# Patient Record
Sex: FEMALE | Race: AMERICAN INDIAN OR ALASKA NATIVE
[De-identification: names, ages, dates, MRNs, and addresses within clinical notes are randomized per-mention and may not be internally consistent; named-entity substitution may affect disease eponyms.]

---

## 2018-04-14 ENCOUNTER — HOSPITAL ENCOUNTER (EMERGENCY)
Dept: HOSPITAL 5 - ED | Age: 33
Discharge: HOME | End: 2018-04-14
Payer: MEDICAID

## 2018-04-14 VITALS — SYSTOLIC BLOOD PRESSURE: 128 MMHG | DIASTOLIC BLOOD PRESSURE: 69 MMHG

## 2018-04-14 DIAGNOSIS — R07.9: Primary | ICD-10-CM

## 2018-04-14 DIAGNOSIS — R06.00: ICD-10-CM

## 2018-04-14 DIAGNOSIS — F17.200: ICD-10-CM

## 2018-04-14 LAB
BASOPHILS # (AUTO): 0 K/MM3 (ref 0–0.1)
BASOPHILS NFR BLD AUTO: 0.5 % (ref 0–1.8)
BUN SERPL-MCNC: 8 MG/DL (ref 7–17)
BUN/CREAT SERPL: 11 %
CALCIUM SERPL-MCNC: 9.1 MG/DL (ref 8.4–10.2)
EOSINOPHIL # BLD AUTO: 0.1 K/MM3 (ref 0–0.4)
EOSINOPHIL NFR BLD AUTO: 0.8 % (ref 0–4.3)
HCT VFR BLD CALC: 44.3 % (ref 30.3–42.9)
HEMOLYSIS INDEX: 9
HGB BLD-MCNC: 14.8 GM/DL (ref 10.1–14.3)
LYMPHOCYTES # BLD AUTO: 3.2 K/MM3 (ref 1.2–5.4)
LYMPHOCYTES NFR BLD AUTO: 44.2 % (ref 13.4–35)
MCH RBC QN AUTO: 31 PG (ref 28–32)
MCHC RBC AUTO-ENTMCNC: 33 % (ref 30–34)
MCV RBC AUTO: 92 FL (ref 79–97)
MONOCYTES # (AUTO): 0.7 K/MM3 (ref 0–0.8)
MONOCYTES % (AUTO): 9.4 % (ref 0–7.3)
PLATELET # BLD: 299 K/MM3 (ref 140–440)
RBC # BLD AUTO: 4.83 M/MM3 (ref 3.65–5.03)

## 2018-04-14 PROCEDURE — 96361 HYDRATE IV INFUSION ADD-ON: CPT

## 2018-04-14 PROCEDURE — 84703 CHORIONIC GONADOTROPIN ASSAY: CPT

## 2018-04-14 PROCEDURE — 99285 EMERGENCY DEPT VISIT HI MDM: CPT

## 2018-04-14 PROCEDURE — 96374 THER/PROPH/DIAG INJ IV PUSH: CPT

## 2018-04-14 PROCEDURE — 71046 X-RAY EXAM CHEST 2 VIEWS: CPT

## 2018-04-14 PROCEDURE — 96375 TX/PRO/DX INJ NEW DRUG ADDON: CPT

## 2018-04-14 PROCEDURE — 93010 ELECTROCARDIOGRAM REPORT: CPT

## 2018-04-14 PROCEDURE — 83880 ASSAY OF NATRIURETIC PEPTIDE: CPT

## 2018-04-14 PROCEDURE — 71275 CT ANGIOGRAPHY CHEST: CPT

## 2018-04-14 PROCEDURE — 93005 ELECTROCARDIOGRAM TRACING: CPT

## 2018-04-14 PROCEDURE — 85025 COMPLETE CBC W/AUTO DIFF WBC: CPT

## 2018-04-14 PROCEDURE — 36415 COLL VENOUS BLD VENIPUNCTURE: CPT

## 2018-04-14 PROCEDURE — 80048 BASIC METABOLIC PNL TOTAL CA: CPT

## 2018-04-14 PROCEDURE — 84484 ASSAY OF TROPONIN QUANT: CPT

## 2018-04-14 NOTE — EMERGENCY DEPARTMENT REPORT
HPI





- General


Chief Complaint: Dyspnea/Respdistress


Time Seen by Provider: 04/14/18 16:46





- HPI


HPI: 








The patient is a 32-year-old female who presents for evaluation of chest pain, 

dyspnea, and left scapula pain.  The patient reports left-sided chest pain and 

scapular pain for the past 2 hours, constant since onset, sharp in quality, 10/

10 in severity, exacerbated with inspiration.  The patient denies trauma to the 

back or chest, fever, syncope, hemoptysis, unilateral leg swelling, recent 

immobilization, history of DVT or PE, hx of recent cancer.








ED Past Medical Hx





- Past Medical History


Hx Hypertension: Yes


Hx Arthritis: Yes





- Surgical History


Past Surgical History?: No





- Social History


Smoking Status: Current Every Day Smoker


Substance Use Type: None





ED Review of Systems


ROS: 


Stated complaint: back pain


Other details as noted in HPI





Constitutional: denies: fever


ENT: denies: throat or neck pain


Respiratory: reports shortness of breath


Cardiovascular: reports chest pain


Endocrine: denies unexplained weight loss or gain


Gastrointestinal: denies: abdominal pain, nausea


Genitourinary: denies: dysuria


Musculoskeletal: denies: leg swelling


Skin: denies: rash


Neurological: pain denies: headache


Hematological/Lymphatic: denies: easy bleeding or easy bruising  


Psych: denies sadness or hopelessness











Physical Exam





- Physical Exam


Vital Signs: 


 Vital Signs











  04/14/18 04/14/18 04/14/18





  16:24 16:25 16:26


 


Temperature   


 


Pulse Rate 94 H 93 H 95 H


 


Respiratory  18 19





Rate   


 


Blood Pressure   149/45


 


O2 Sat by Pulse   99





Oximetry   














  04/14/18 04/14/18 04/14/18





  16:27 16:29 16:31


 


Temperature   


 


Pulse Rate 92 H 80 86


 


Respiratory 14 12 26 H





Rate   


 


Blood Pressure 149/45 149/45 120/73


 


O2 Sat by Pulse 100 100 79 L





Oximetry   














  04/14/18 04/14/18 04/14/18





  16:33 16:35 16:36


 


Temperature 98.1 F  97.3 F L


 


Pulse Rate 78 89 


 


Respiratory 17 17 





Rate   


 


Blood Pressure 120/73 120/73 


 


O2 Sat by Pulse 99 99 





Oximetry   














  04/14/18 04/14/18 04/14/18





  16:37 16:39 16:41


 


Temperature   


 


Pulse Rate 86 85 84


 


Respiratory 18 22 18





Rate   


 


Blood Pressure 120/73 120/73 120/73


 


O2 Sat by Pulse 100 100 100





Oximetry   














  04/14/18 04/14/18 04/14/18





  16:43 16:45 16:47


 


Temperature   


 


Pulse Rate 71 64 88


 


Respiratory 15 15 17





Rate   


 


Blood Pressure 120/73 116/66 116/66


 


O2 Sat by Pulse 100 100 100





Oximetry   














  04/14/18 04/14/18 04/14/18





  16:49 16:51 16:53


 


Temperature   


 


Pulse Rate 95 H 90 77


 


Respiratory 17 24 16





Rate   


 


Blood Pressure 116/66 116/66 116/66


 


O2 Sat by Pulse 100 100 99





Oximetry   














  04/14/18 04/14/18 04/14/18





  16:55 16:57 16:59


 


Temperature   


 


Pulse Rate 95 H 81 86


 


Respiratory 18 12 13





Rate   


 


Blood Pressure 116/66 116/66 117/66


 


O2 Sat by Pulse 100 100 100





Oximetry   














  04/14/18 04/14/18 04/14/18





  17:00 17:01 17:03


 


Temperature   


 


Pulse Rate 86 80 82


 


Respiratory 10 L 19 17





Rate   


 


Blood Pressure 117/66 117/66 117/66


 


O2 Sat by Pulse 100 100 100





Oximetry   














  04/14/18 04/14/18 04/14/18





  17:05 17:07 17:09


 


Temperature   


 


Pulse Rate 85 86 80


 


Respiratory 9 L 14 13





Rate   


 


Blood Pressure 117/66 117/66 117/66


 


O2 Sat by Pulse 100 100 100





Oximetry   














  04/14/18 04/14/18 04/14/18





  17:11 17:14 17:15


 


Temperature   


 


Pulse Rate 69 77 76


 


Respiratory 15 14 14





Rate   


 


Blood Pressure 117/66 117/66 117/64


 


O2 Sat by Pulse 100 100 100





Oximetry   














  04/14/18





  17:18


 


Temperature 


 


Pulse Rate 


 


Respiratory 18





Rate 


 


Blood Pressure 


 


O2 Sat by Pulse 100





Oximetry 











Physical Exam: 








General: well-nourished, well-developed, no acute distress


Head: Normocephalic, atraumatic


Eyes: normal sclera


ENT: Mucous membranes are pink and moist 


Neck: trachea midline, neck supple, No neck stiffness, no cervical adenopathy


Respiratory: Breath sounds equal bilaterally, no wheezing, rales, or rhonchi


Cardio: S1 and S2 present, no murmurs, rubs, gallops, capillary refill is brisk


Abdomen: Normoactive bowel sounds, soft abdomen, no rigidity, no guarding or 

rebound tenderness


Chest WALL/Back: No tenderness to palpation of the chest wall, no CVA 

tenderness with percussion


Musc: No pitting edema


Skin: No rash


Neuro: no facial drooping, normal speech


Psych: Normal affect





ED Course


 Vital Signs











  04/14/18 04/14/18 04/14/18





  16:24 16:25 16:26


 


Temperature   


 


Pulse Rate 94 H 93 H 95 H


 


Respiratory  18 19





Rate   


 


Blood Pressure   149/45


 


O2 Sat by Pulse   99





Oximetry   














  04/14/18 04/14/18 04/14/18





  16:27 16:29 16:31


 


Temperature   


 


Pulse Rate 92 H 80 86


 


Respiratory 14 12 26 H





Rate   


 


Blood Pressure 149/45 149/45 120/73


 


O2 Sat by Pulse 100 100 79 L





Oximetry   














  04/14/18 04/14/18 04/14/18





  16:33 16:35 16:36


 


Temperature 98.1 F  97.3 F L


 


Pulse Rate 78 89 


 


Respiratory 17 17 





Rate   


 


Blood Pressure 120/73 120/73 


 


O2 Sat by Pulse 99 99 





Oximetry   














  04/14/18 04/14/18 04/14/18





  16:37 16:39 16:41


 


Temperature   


 


Pulse Rate 86 85 84


 


Respiratory 18 22 18





Rate   


 


Blood Pressure 120/73 120/73 120/73


 


O2 Sat by Pulse 100 100 100





Oximetry   














  04/14/18 04/14/18 04/14/18





  16:43 16:45 16:47


 


Temperature   


 


Pulse Rate 71 64 88


 


Respiratory 15 15 17





Rate   


 


Blood Pressure 120/73 116/66 116/66


 


O2 Sat by Pulse 100 100 100





Oximetry   














  04/14/18 04/14/18 04/14/18





  16:49 16:51 16:53


 


Temperature   


 


Pulse Rate 95 H 90 77


 


Respiratory 17 24 16





Rate   


 


Blood Pressure 116/66 116/66 116/66


 


O2 Sat by Pulse 100 100 99





Oximetry   














  04/14/18 04/14/18 04/14/18





  16:55 16:57 16:59


 


Temperature   


 


Pulse Rate 95 H 81 86


 


Respiratory 18 12 13





Rate   


 


Blood Pressure 116/66 116/66 117/66


 


O2 Sat by Pulse 100 100 100





Oximetry   














  04/14/18 04/14/18 04/14/18





  17:00 17:01 17:03


 


Temperature   


 


Pulse Rate 86 80 82


 


Respiratory 10 L 19 17





Rate   


 


Blood Pressure 117/66 117/66 117/66


 


O2 Sat by Pulse 100 100 100





Oximetry   














  04/14/18 04/14/18 04/14/18





  17:05 17:07 17:09


 


Temperature   


 


Pulse Rate 85 86 80


 


Respiratory 9 L 14 13





Rate   


 


Blood Pressure 117/66 117/66 117/66


 


O2 Sat by Pulse 100 100 100





Oximetry   














  04/14/18 04/14/18 04/14/18





  17:11 17:14 17:15


 


Temperature   


 


Pulse Rate 69 77 76


 


Respiratory 15 14 14





Rate   


 


Blood Pressure 117/66 117/66 117/64


 


O2 Sat by Pulse 100 100 100





Oximetry   














  04/14/18





  17:18


 


Temperature 


 


Pulse Rate 


 


Respiratory 18





Rate 


 


Blood Pressure 


 


O2 Sat by Pulse 100





Oximetry 














ED Medical Decision Making





- Lab Data


Result diagrams: 


 04/14/18 16:42





 04/14/18 16:42





- Medical Decision Making








The patient was seen and examined by myself.  The patient is placed on a 

cardiac monitor and continuous pulse ox. On initial evaluation, the patient was 

found to be in no distress.  EKG was negative for findings suggestive of acute 

cardiac infarct. Labs and imaging are obtained.  The patient is given pain 

medicine. Chest x-ray is negative for pneumothorax, focal consolidation, 

pulmonary vascular congestion, pleural effusion, or other obvious acute 

cardiopulmonary disease process.  Lab results were non-concerning including 

levels of troponin, WBC, hemoglobin, hematocrit, electrolytes, renal function. 

The patient was reevaluated and reported that their symptoms were markedly 

improved.  As the patient has a KRISTOPHER risk score less than 2, and received a CT 

angiogram of the chest negative for pulmonary embolism, the patient is at low 

risk of ACS or pulmonary emboli etiology of their symptoms. The patient is 

stable for discharge with outpatient follow-up.  The patient is given follow-up 

and return instructions.  The patient expressed understanding and agreed with 

the plan.  The patient is discharged in stable condition.


Critical care attestation.: 


If time is entered above; I have spent that time in minutes in the direct care 

of this critically ill patient, excluding procedure time.








ED Disposition


Clinical Impression: 


 Acute chest pain, Dyspnea on effort





Disposition: DC-01 TO HOME OR SELFCARE


Is pt being admited?: No


Does the pt Need Aspirin: No


Condition: Stable


Instructions:  Chest Pain (ED)


Referrals: 


PRIMARY CARE,MD [Primary Care Provider] - 3-5 Days


Time of Disposition: 18:03

## 2018-04-14 NOTE — CAT SCAN REPORT
FINAL REPORT



PROCEDURE:  CT ANGIO CHEST



TECHNIQUE:  Computerized tomographic angiography of the chest was

performed after the IV injection of iodinated nonionic contrast

including image processing. The image data was postprocessed

using 2-dimensional multiplanar reformatted (MPR) and

3-dimensional (MIP and/or volume rendered) techniques. 



HISTORY:  chest pain 



COMPARISON:  No prior studies are available for comparison.



FINDINGS:  

Aorta is of normal caliber without evidence of dissection. There

is suboptimal opacification of pulmonary arterial tree without

any obvious filling defects. Thyroid demonstrates normal density.

Hilar structures are within normal limits. There is no

lymphadenopathy. Cardiac size is within normal limits. Bilateral

lungs are free of infiltrates or mass lesions. Pleural spaces are

clear. Visualized upper abdominal structures are within normal

limits. Vertebral height is normal. 



IMPRESSION:  

No acute abnormality.

## 2018-04-14 NOTE — XRAY REPORT
FINAL REPORT



PROCEDURE:  XR CHEST ROUTINE 2V



TECHNIQUE:  PA and lateral chest radiographs were obtained. CPT

80190







HISTORY:  Shortness of breath 



COMPARISON:  No prior studies are available for comparison.



FINDINGS:  

Heart: Normal.



Mediastinum/Vessels: Normal.



Lungs/Pleural space: Normal.



Bony thorax: No acute osseous abnormality.



Other: 



IMPRESSION:  

Normal examination.

## 2020-02-21 ENCOUNTER — HOSPITAL ENCOUNTER (OUTPATIENT)
Dept: HOSPITAL 5 - ED | Age: 35
Setting detail: OBSERVATION
LOS: 3 days | Discharge: HOME | End: 2020-02-24
Attending: INTERNAL MEDICINE | Admitting: INTERNAL MEDICINE
Payer: MEDICAID

## 2020-02-21 DIAGNOSIS — E87.2: ICD-10-CM

## 2020-02-21 DIAGNOSIS — K52.9: ICD-10-CM

## 2020-02-21 DIAGNOSIS — E86.0: ICD-10-CM

## 2020-02-21 DIAGNOSIS — Z79.899: ICD-10-CM

## 2020-02-21 DIAGNOSIS — K59.00: ICD-10-CM

## 2020-02-21 DIAGNOSIS — I10: ICD-10-CM

## 2020-02-21 DIAGNOSIS — N17.9: Primary | ICD-10-CM

## 2020-02-21 DIAGNOSIS — F19.10: ICD-10-CM

## 2020-02-21 DIAGNOSIS — R11.2: ICD-10-CM

## 2020-02-21 DIAGNOSIS — F17.200: ICD-10-CM

## 2020-02-21 DIAGNOSIS — M19.90: ICD-10-CM

## 2020-02-21 LAB
ALBUMIN SERPL-MCNC: 3.6 G/DL (ref 3.9–5)
ALT SERPL-CCNC: 8 UNITS/L (ref 7–56)
BACTERIA #/AREA URNS HPF: (no result) /HPF
BASOPHILS # (AUTO): 0.1 K/MM3 (ref 0–0.1)
BASOPHILS NFR BLD AUTO: 0.7 % (ref 0–1.8)
BILIRUB UR QL STRIP: (no result)
BLOOD UR QL VISUAL: (no result)
BUN SERPL-MCNC: 18 MG/DL (ref 7–17)
BUN/CREAT SERPL: 5 %
CALCIUM SERPL-MCNC: 8.9 MG/DL (ref 8.4–10.2)
EOSINOPHIL # BLD AUTO: 0 K/MM3 (ref 0–0.4)
EOSINOPHIL NFR BLD AUTO: 0.2 % (ref 0–4.3)
HCT VFR BLD CALC: 39.9 % (ref 30.3–42.9)
HEMOLYSIS INDEX: 100
HGB BLD-MCNC: 13.1 GM/DL (ref 10.1–14.3)
LYMPHOCYTES # BLD AUTO: 1.9 K/MM3 (ref 1.2–5.4)
LYMPHOCYTES NFR BLD AUTO: 15.1 % (ref 13.4–35)
MCHC RBC AUTO-ENTMCNC: 33 % (ref 30–34)
MCV RBC AUTO: 90 FL (ref 79–97)
MONOCYTES # (AUTO): 1.1 K/MM3 (ref 0–0.8)
MONOCYTES % (AUTO): 8.6 % (ref 0–7.3)
MUCOUS THREADS #/AREA URNS HPF: (no result) /HPF
PH UR STRIP: 5 [PH] (ref 5–7)
PLATELET # BLD: 415 K/MM3 (ref 140–440)
PROT UR STRIP-MCNC: (no result) MG/DL
RBC # BLD AUTO: 4.42 M/MM3 (ref 3.65–5.03)
RBC #/AREA URNS HPF: 2 /HPF (ref 0–6)
UROBILINOGEN UR-MCNC: < 2 MG/DL (ref ?–2)
WBC #/AREA URNS HPF: 1 /HPF (ref 0–6)

## 2020-02-21 PROCEDURE — 96375 TX/PRO/DX INJ NEW DRUG ADDON: CPT

## 2020-02-21 PROCEDURE — 76770 US EXAM ABDO BACK WALL COMP: CPT

## 2020-02-21 PROCEDURE — 80320 DRUG SCREEN QUANTALCOHOLS: CPT

## 2020-02-21 PROCEDURE — 85730 THROMBOPLASTIN TIME PARTIAL: CPT

## 2020-02-21 PROCEDURE — 88305 TISSUE EXAM BY PATHOLOGIST: CPT

## 2020-02-21 PROCEDURE — 81001 URINALYSIS AUTO W/SCOPE: CPT

## 2020-02-21 PROCEDURE — 96374 THER/PROPH/DIAG INJ IV PUSH: CPT

## 2020-02-21 PROCEDURE — 74176 CT ABD & PELVIS W/O CONTRAST: CPT

## 2020-02-21 PROCEDURE — 83690 ASSAY OF LIPASE: CPT

## 2020-02-21 PROCEDURE — C9113 INJ PANTOPRAZOLE SODIUM, VIA: HCPCS

## 2020-02-21 PROCEDURE — 43235 EGD DIAGNOSTIC BRUSH WASH: CPT

## 2020-02-21 PROCEDURE — 80048 BASIC METABOLIC PNL TOTAL CA: CPT

## 2020-02-21 PROCEDURE — 88342 IMHCHEM/IMCYTCHM 1ST ANTB: CPT

## 2020-02-21 PROCEDURE — 36415 COLL VENOUS BLD VENIPUNCTURE: CPT

## 2020-02-21 PROCEDURE — 85610 PROTHROMBIN TIME: CPT

## 2020-02-21 PROCEDURE — 99284 EMERGENCY DEPT VISIT MOD MDM: CPT

## 2020-02-21 PROCEDURE — 84156 ASSAY OF PROTEIN URINE: CPT

## 2020-02-21 PROCEDURE — 96361 HYDRATE IV INFUSION ADD-ON: CPT

## 2020-02-21 PROCEDURE — G0378 HOSPITAL OBSERVATION PER HR: HCPCS

## 2020-02-21 PROCEDURE — 96376 TX/PRO/DX INJ SAME DRUG ADON: CPT

## 2020-02-21 PROCEDURE — 84300 ASSAY OF URINE SODIUM: CPT

## 2020-02-21 PROCEDURE — G0480 DRUG TEST DEF 1-7 CLASSES: HCPCS

## 2020-02-21 PROCEDURE — 80307 DRUG TEST PRSMV CHEM ANLYZR: CPT

## 2020-02-21 PROCEDURE — 83930 ASSAY OF BLOOD OSMOLALITY: CPT

## 2020-02-21 PROCEDURE — 85025 COMPLETE CBC W/AUTO DIFF WBC: CPT

## 2020-02-21 PROCEDURE — 71045 X-RAY EXAM CHEST 1 VIEW: CPT

## 2020-02-21 PROCEDURE — 82570 ASSAY OF URINE CREATININE: CPT

## 2020-02-21 PROCEDURE — 80053 COMPREHEN METABOLIC PANEL: CPT

## 2020-02-21 PROCEDURE — 84703 CHORIONIC GONADOTROPIN ASSAY: CPT

## 2020-02-21 PROCEDURE — 96372 THER/PROPH/DIAG INJ SC/IM: CPT

## 2020-02-21 PROCEDURE — 84550 ASSAY OF BLOOD/URIC ACID: CPT

## 2020-02-22 LAB
BASOPHILS # (AUTO): 0 K/MM3 (ref 0–0.1)
BASOPHILS NFR BLD AUTO: 0.4 % (ref 0–1.8)
BENZODIAZEPINES SCREEN,URINE: (no result)
BUN SERPL-MCNC: 17 MG/DL (ref 7–17)
BUN/CREAT SERPL: 5 %
CALCIUM SERPL-MCNC: 7.9 MG/DL (ref 8.4–10.2)
EOSINOPHIL # BLD AUTO: 0.1 K/MM3 (ref 0–0.4)
EOSINOPHIL NFR BLD AUTO: 0.5 % (ref 0–4.3)
HCT VFR BLD CALC: 37.3 % (ref 30.3–42.9)
HEMOLYSIS INDEX: 8
HGB BLD-MCNC: 12.3 GM/DL (ref 10.1–14.3)
LYMPHOCYTES # BLD AUTO: 2.1 K/MM3 (ref 1.2–5.4)
LYMPHOCYTES NFR BLD AUTO: 19.3 % (ref 13.4–35)
MCHC RBC AUTO-ENTMCNC: 33 % (ref 30–34)
MCV RBC AUTO: 89 FL (ref 79–97)
METHADONE SCREEN,URINE: (no result)
MONOCYTES # (AUTO): 1.1 K/MM3 (ref 0–0.8)
MONOCYTES % (AUTO): 10 % (ref 0–7.3)
OPIATE SCREEN,URINE: (no result)
PLATELET # BLD: 395 K/MM3 (ref 140–440)
RBC # BLD AUTO: 4.19 M/MM3 (ref 3.65–5.03)

## 2020-02-22 RX ADMIN — MORPHINE SULFATE PRN MG: 2 INJECTION, SOLUTION INTRAMUSCULAR; INTRAVENOUS at 06:17

## 2020-02-22 RX ADMIN — LACTULOSE SCH: 20 SOLUTION ORAL at 17:34

## 2020-02-22 RX ADMIN — MORPHINE SULFATE PRN MG: 2 INJECTION, SOLUTION INTRAMUSCULAR; INTRAVENOUS at 02:04

## 2020-02-22 RX ADMIN — Medication SCH ML: at 22:47

## 2020-02-22 RX ADMIN — HEPARIN SODIUM SCH UNIT: 5000 INJECTION, SOLUTION INTRAVENOUS; SUBCUTANEOUS at 06:14

## 2020-02-22 RX ADMIN — MORPHINE SULFATE PRN MG: 2 INJECTION, SOLUTION INTRAMUSCULAR; INTRAVENOUS at 22:46

## 2020-02-22 RX ADMIN — SODIUM CHLORIDE SCH MLS/HR: 0.9 INJECTION, SOLUTION INTRAVENOUS at 01:16

## 2020-02-22 RX ADMIN — HEPARIN SODIUM SCH UNIT: 5000 INJECTION, SOLUTION INTRAVENOUS; SUBCUTANEOUS at 15:03

## 2020-02-22 RX ADMIN — Medication SCH: at 10:08

## 2020-02-22 RX ADMIN — ONDANSETRON PRN MG: 2 INJECTION INTRAMUSCULAR; INTRAVENOUS at 22:46

## 2020-02-22 RX ADMIN — ONDANSETRON PRN MG: 2 INJECTION INTRAMUSCULAR; INTRAVENOUS at 11:00

## 2020-02-22 RX ADMIN — SODIUM CHLORIDE SCH MLS/HR: 0.9 INJECTION, SOLUTION INTRAVENOUS at 14:55

## 2020-02-22 RX ADMIN — ONDANSETRON PRN MG: 2 INJECTION INTRAMUSCULAR; INTRAVENOUS at 14:53

## 2020-02-22 RX ADMIN — MORPHINE SULFATE PRN MG: 2 INJECTION, SOLUTION INTRAMUSCULAR; INTRAVENOUS at 10:57

## 2020-02-22 RX ADMIN — MORPHINE SULFATE PRN MG: 2 INJECTION, SOLUTION INTRAMUSCULAR; INTRAVENOUS at 14:53

## 2020-02-22 RX ADMIN — HEPARIN SODIUM SCH UNIT: 5000 INJECTION, SOLUTION INTRAVENOUS; SUBCUTANEOUS at 22:46

## 2020-02-22 RX ADMIN — SODIUM CHLORIDE SCH MLS/HR: 0.9 INJECTION, SOLUTION INTRAVENOUS at 23:00

## 2020-02-22 RX ADMIN — LACTULOSE SCH GM: 20 SOLUTION ORAL at 17:46

## 2020-02-22 NOTE — HISTORY AND PHYSICAL REPORT
History of Present Illness


Date of examination: 02/22/20


Date of admission: 


02/22/20 00:38





Chief complaint: 





Nausea and vomiting


Constipation


History of present illness: 





34 year old  female seen in the ER c/o nausea and vomiting for 

three days. She has had some constipation .She denies any fever, no chills, no 

abdominal pain, no dysuria or hematuria.


She states she ate some chicken at a restaurant few days ago and subsequently 

started having nausea and vomiting. She denies any sick contacts, no recent 

travels.





Evaluation in the ER including CT abdomen did not reveal any significant 

abnormalities. BUN/Creatinine was however elevated.





Past History


Past Medical History: hypertension


Past Surgical History: No surgical history


Social history: smoking (Smokes occasionally), alcohol abuse (Alcohol 

occasionally)


Family history: cancer (Breast cancer runs in family)





Medications and Allergies


                                    Allergies











Allergy/AdvReac Type Severity Reaction Status Date / Time


 


No Known Allergies Allergy   Verified 02/21/20 19:46











                                Home Medications











 Medication  Instructions  Recorded  Confirmed  Last Taken  Type


 


No Known Home Medications [No  02/22/20 02/22/20 Unknown History





Reported Home Medications]     











Active Meds: 


Active Medications





Acetaminophen (Tylenol)  650 mg PO Q4H PRN


   PRN Reason: Pain MILD(1-3)/Fever >100.5/HA


Heparin Sodium (Porcine) (Heparin)  5,000 unit SUB-Q Q8HR HOMER


Sodium Chloride (Nacl 0.9% 1000 Ml)  1,000 mls @ 125 mls/hr IV AS DIRECT HOMER


   Last Admin: 02/22/20 01:16 Dose:  125 mls/hr


   Documented by: 


Magnesium Hydroxide (Milk Of Magnesia)  30 ml PO Q4H PRN


   PRN Reason: Constipation


Morphine Sulfate (Morphine)  2 mg IV Q4H PRN


   PRN Reason: Pain, Moderate (4-6)


Ondansetron HCl (Zofran)  4 mg IV Q8H PRN


   PRN Reason: Nausea And Vomiting


Sodium Chloride (Sodium Chloride Flush Syringe 10 Ml)  10 ml IV BID HOMER


Sodium Chloride (Sodium Chloride Flush Syringe 10 Ml)  10 ml IV PRN PRN


   PRN Reason: LINE FLUSH











Review of Systems


Constitutional: no fever, no chills


Cardiovascular: no chest pain, no palpitations


Respiratory: no cough, no hemoptysis


Gastrointestinal: abdominal pain, nausea, vomiting, constipation, loss of 

appetite


Genitourinary Female: no dysuria, no hematuria


Musculoskeletal: no neck pain, no low back pain


Integumentary: no rash, no pruritis





Exam





- Constitutional


Vitals: 


                                        











Temp Pulse Resp BP Pulse Ox


 


 97.9 F   76   16   118/79   100 


 


 02/22/20 01:08  02/22/20 01:08  02/22/20 01:08  02/22/20 01:08  02/22/20 01:08











General appearance: Present: no acute distress, well-nourished





- EENT


Eyes: Present: PERRL, EOM intact


ENT: hearing intact, clear oral mucosa, dentition normal





- Neck


Neck: Present: supple, normal ROM





- Respiratory


Respiratory effort: normal


Respiratory: bilateral: CTA





- Cardiovascular


Rhythm: regular


Heart Sounds: Present: S1 & S2





- Extremities


Extremities: no ischemia, pulses intact, pulses symmetrical, No edema, Full ROM


Peripheral Pulses: within normal limits





- Abdominal


General gastrointestinal: Present: soft, non-tender, non-distended, normal bowel

sounds





- Integumentary


Integumentary: Present: clear, warm, dry, normal turgor





- Musculoskeletal


Musculoskeletal: strength equal bilaterally





- Psychiatric


Psychiatric: appropriate mood/affect, intact judgment & insight, cooperative





- Neurologic


Neurologic: CNII-XII intact, moves all extremities





Results





- Labs


CBC & Chem 7: 


                                 02/21/20 20:49





                                 02/21/20 20:49


Labs: 


                              Abnormal lab results











  02/21/20 02/21/20 Range/Units





  20:49 20:49 


 


WBC  12.9 H   (4.5-11.0)  K/mm3


 


Mono % (Auto)  8.6 H   (0.0-7.3)  %


 


Mono #  1.1 H   (0.0-0.8)  K/mm3


 


Seg Neutrophils %  75.4 H   (40.0-70.0)  %


 


Seg Neutrophils #  9.7 H   (1.8-7.7)  K/mm3


 


Sodium   135 L  (137-145)  mmol/L


 


BUN   18 H  (7-17)  mg/dL


 


Creatinine   3.5 H  (0.7-1.2)  mg/dL


 


Albumin   3.6 L  (3.9-5)  g/dL














Assessment and Plan





- Patient Problems


(1) LOUIE (acute kidney injury)


Current Visit: Yes   Status: Acute   


Plan to address problem: 


Probably secondary to the nausea and vomiting. Started on IV fluid.


Will monitor Bun/Creatinine.








(2) Severe dehydration


Current Visit: Yes   Status: Acute   


Plan to address problem: 


Secondary to nausea and vomiting. Continue on hydration with IV fluid.








(3) DVT prophylaxis


Current Visit: Yes   Status: Acute   


Plan to address problem: 


Placed on subcutaneous heparin.








(4) Full code status


Current Visit: Yes   Status: Acute

## 2020-02-22 NOTE — PROGRESS NOTE
Assessment and Plan


Assessment and plan: 


34 year old  female seen in the ER c/o nausea and vomiting for 

three days. She has had some constipation .She denies any fever, no chills, no 

abdominal pain, no dysuria or hematuria.


She states she ate some chicken at a restaurant few days ago and subsequently 

started having nausea and vomiting. She denies any sick contacts, no recent 

travels. Evaluation in the ER including CT abdomen did not reveal any 

significant abnormalities. BUN/Creatinine was however elevate





* Patient continues to have Epigastric abdominal pain, has not been able to 

  tolerate PO intake and still no BM. 


* She initially denied use of Marijuana but when advised of UDS, said she took 

  it from the sister after having abdominal pain, nausea and vomiting x 3 days 

  and her sister said to try it and it will work. she states it calmed her 

  nerves. she denies any fever


* Increase IVF to 150cc an hr, creatinine still 3.1. She denies any past renal 

  disease. She although reports hx of HTN


* Give lactulose


* LFTs are normal.


* GI consulted. 


* Anticipate discharge in am. 








LOUIE Secondary to vasomotor nephropathy


Gastroenteritis severe


Abdominal pain- Epigastric-Etiology unknown


Constipation


Persistent Nausea and Vomiting


Polysubstance abuse. 


Acute Metabolic Acidosis





History


Interval history: 


Patient seen and examined remains in pain with epigastric pain 7 out of 10 in 

intensity still nauseated but has not been calling the nurses to let them know.








Hospitalist Physical





- Physical exam


Narrative exam: 


VITAL SIGNS:  Reviewed.    


GENERAL:  The patient appears normally developed, Vital signs as documented.


HEAD:  No signs of head trauma.


EYES:  Pupils are equal.  Extraocular motions intact.  


EARS:  Hearing grossly intact.


MOUTH:  Oropharynx is normal. 


NECK:  No adenopathy, no JVD.  


CHEST:  Chest with clear breath sounds bilaterally.  No wheezes, rales, or 

rhonchi.  


CARDIAC:  Regular rate and rhythm.  S1 and S2, without murmurs, gallops, or 

rubs.


VASCULAR:  No Edema.  Peripheral pulses normal and equal in all extremities.


ABDOMEN:  Soft, epigastric tender.  And non distended.  No   rebound or gua

rding, and no masses palpated.   Bowel Sounds normal.


MUSCULOSKELETAL:  Good range of motion of all major joints. Extremities without 

clubbing, cyanosis or edema.  


NEUROLOGIC EXAM:  Alert and oriented x 3   No focal sensory or strength 

deficits.   Speech normal.  Follows commands.


PSYCHIATRIC:  Mood normal.


SKIN: Multiple tattoos.  Detail exam as documented in skin assessment








- Constitutional


Vitals: 


                                        











Temp Pulse Resp BP Pulse Ox


 


 98.2 F   76   18   116/68   100 


 


 02/22/20 04:38  02/22/20 01:57  02/22/20 06:17  02/22/20 04:38  02/22/20 01:57











General appearance: Present: no acute distress, well-nourished





Results





- Labs


CBC & Chem 7: 


                                 02/22/20 09:03





                                 02/22/20 09:03


Labs: 


                             Laboratory Last Values











WBC  11.0 K/mm3 (4.5-11.0)   02/22/20  09:03    


 


RBC  4.19 M/mm3 (3.65-5.03)   02/22/20  09:03    


 


Hgb  12.3 gm/dl (10.1-14.3)   02/22/20  09:03    


 


Hct  37.3 % (30.3-42.9)   02/22/20  09:03    


 


MCV  89 fl (79-97)   02/22/20  09:03    


 


MCH  29 pg (28-32)   02/22/20  09:03    


 


MCHC  33 % (30-34)   02/22/20  09:03    


 


RDW  14.8 % (13.2-15.2)   02/22/20  09:03    


 


Plt Count  395 K/mm3 (140-440)   02/22/20  09:03    


 


Lymph % (Auto)  19.3 % (13.4-35.0)   02/22/20  09:03    


 


Mono % (Auto)  10.0 % (0.0-7.3)  H  02/22/20  09:03    


 


Eos % (Auto)  0.5 % (0.0-4.3)   02/22/20  09:03    


 


Baso % (Auto)  0.4 % (0.0-1.8)   02/22/20  09:03    


 


Lymph #  2.1 K/mm3 (1.2-5.4)   02/22/20  09:03    


 


Mono #  1.1 K/mm3 (0.0-0.8)  H  02/22/20  09:03    


 


Eos #  0.1 K/mm3 (0.0-0.4)   02/22/20  09:03    


 


Baso #  0.0 K/mm3 (0.0-0.1)   02/22/20  09:03    


 


Seg Neutrophils %  69.8 % (40.0-70.0)   02/22/20  09:03    


 


Seg Neutrophils #  7.7 K/mm3 (1.8-7.7)   02/22/20  09:03    


 


Sodium  138 mmol/L (137-145)   02/22/20  09:03    


 


Potassium  4.0 mmol/L (3.6-5.0)   02/22/20  09:03    


 


Chloride  107.9 mmol/L ()  H  02/22/20  09:03    


 


Carbon Dioxide  18 mmol/L (22-30)  L  02/22/20  09:03    


 


Anion Gap  16 mmol/L  02/22/20  09:03    


 


BUN  17 mg/dL (7-17)   02/22/20  09:03    


 


Creatinine  3.1 mg/dL (0.7-1.2)  H  02/22/20  09:03    


 


Estimated GFR  21 ml/min  02/22/20  09:03    


 


BUN/Creatinine Ratio  5 %  02/22/20  09:03    


 


Glucose  112 mg/dL ()  H  02/22/20  09:03    


 


Calcium  7.9 mg/dL (8.4-10.2)  L  02/22/20  09:03    


 


Total Bilirubin  0.30 mg/dL (0.1-1.2)   02/21/20  20:49    


 


AST  16 units/L (5-40)   02/21/20  20:49    


 


ALT  8 units/L (7-56)   02/21/20  20:49    


 


Alkaline Phosphatase  61 units/L ()   02/21/20  20:49    


 


Total Protein  6.5 g/dL (6.3-8.2)   02/21/20  20:49    


 


Albumin  3.6 g/dL (3.9-5)  L  02/21/20  20:49    


 


Albumin/Globulin Ratio  1.2 %  02/21/20  20:49    


 


Lipase  55 units/L (13-60)   02/21/20  20:49    


 


HCG, Qual  Negative  (Negative)   02/21/20  20:49    


 


Urine Color  Yellow  (Yellow)   02/21/20  20:52    


 


Urine Turbidity  Clear  (Clear)   02/21/20  20:52    


 


Urine pH  5.0  (5.0-7.0)   02/21/20  20:52    


 


Ur Specific Gravity  1.011  (1.003-1.030)   02/21/20  20:52    


 


Urine Protein  <15 mg/dl mg/dL (Negative)   02/21/20  20:52    


 


Urine Glucose (UA)  Neg mg/dL (Negative)   02/21/20  20:52    


 


Urine Ketones  Neg mg/dL (Negative)   02/21/20  20:52    


 


Urine Blood  Neg  (Negative)   02/21/20  20:52    


 


Urine Nitrite  Neg  (Negative)   02/21/20  20:52    


 


Urine Bilirubin  Neg  (Negative)   02/21/20  20:52    


 


Urine Urobilinogen  < 2.0 mg/dL (<2.0)   02/21/20  20:52    


 


Ur Leukocyte Esterase  Neg  (Negative)   02/21/20  20:52    


 


Urine WBC (Auto)  1.0 /HPF (0.0-6.0)   02/21/20  20:52    


 


Urine RBC (Auto)  2.0 /HPF (0.0-6.0)   02/21/20  20:52    


 


U Epithel Cells (Auto)  3.0 /HPF (0-13.0)   02/21/20  20:52    


 


Urine Bacteria (Auto)  2+ /HPF (Negative)   02/21/20  20:52    


 


Urine Mucus  Few /HPF  02/21/20  20:52    


 


Urine Opiates Screen  Presumptive negative   02/22/20  01:02    


 


Urine Methadone Screen  Presumptive negative   02/22/20  01:02    


 


Ur Barbiturates Screen  Presumptive negative   02/22/20  01:02    


 


Ur Phencyclidine Scrn  Presumptive negative   02/22/20  01:02    


 


Ur Amphetamines Screen  Presumptive positive   02/22/20  01:02    


 


U Benzodiazepines Scrn  Presumptive negative   02/22/20  01:02    


 


Urine Cocaine Screen  Presumptive negative   02/22/20  01:02    


 


U Marijuana (THC) Screen  Presumptive positive   02/22/20  01:02    


 


Drugs of Abuse Note  Disclamer   02/22/20  01:02    


 


Plasma/Serum Alcohol  < 0.01 % (0-0.07)   02/22/20  00:59    














Active Medications





- Current Medications


Current Medications: 














Generic Name Dose Route Start Last Admin





  Trade Name Freq  PRN Reason Stop Dose Admin


 


Acetaminophen  650 mg  02/22/20 00:49 





  Tylenol  PO  





  Q4H PRN  





  Pain MILD(1-3)/Fever >100.5/HA  


 


Heparin Sodium (Porcine)  5,000 unit  02/22/20 06:00  02/22/20 06:14





  Heparin  SUB-Q   5,000 unit





  Q8HR HOMER   Administration


 


Sodium Chloride  1,000 mls @ 150 mls/hr  02/22/20 01:00  02/22/20 01:16





  Nacl 0.9% 1000 Ml  IV   125 mls/hr





  AS DIRECT HOMER   Administration


 


Magnesium Hydroxide  30 ml  02/22/20 00:49 





  Milk Of Magnesia  PO  





  Q4H PRN  





  Constipation  


 


Morphine Sulfate  2 mg  02/22/20 00:49  02/22/20 10:57





  Morphine  IV   2 mg





  Q4H PRN   Administration





  Pain, Moderate (4-6)  


 


Ondansetron HCl  4 mg  02/22/20 09:22  02/22/20 11:00





  Zofran  IV   4 mg





  Q4H PRN   Administration





  Nausea And Vomiting  


 


Sodium Chloride  10 ml  02/22/20 10:00  02/22/20 10:08





  Sodium Chloride Flush Syringe 10 Ml  IV   Not Given





  BID HOMER  


 


Sodium Chloride  10 ml  02/22/20 00:49 





  Sodium Chloride Flush Syringe 10 Ml  IV  





  PRN PRN  





  LINE FLUSH

## 2020-02-22 NOTE — CONSULTATION
REFERRING PHYSICIAN:  Dr. Jalil Garcia.



INDICATION:

Nausea, vomiting.



HISTORY OF PRESENT ILLNESS:  The patient is a 34-year-old black female presents

with nausea, vomiting.  The patient reports after having a meal at a restaurant

she started having nausea, vomiting and has been doing so for the last 3 days. 

She reports no hematemesis.  She reports no bright red blood per rectum.  She

reports she has never had symptoms like this before.  The patient notes some

epigastric pain.  She denies any weight loss.  Denies any other specific

complaints.



PAST MEDICAL HISTORY:  Hypertension.



MEDICATIONS:  Reviewed and updated in chart.



ALLERGIES:  No known drug allergies.



SOCIAL HISTORY:  Denies alcohol, tobacco or drug abuse.



FAMILY HISTORY:  Negative for colon cancer, IBD, or liver disease.



REVIEW OF SYSTEMS:

GENERAL:  Reports some mild weakness.

HEENT:  No visual complaints or tinnitus.

PULMONARY:  No shortness of breath, chest pain.

GASTROINTESTINAL:  Reports some abdominal pain.

All points of 13-point review of systems otherwise negative.



PHYSICAL EXAMINATION:

VITAL SIGNS:  Temperature of 98.2, pulse 88, respirations 18, blood pressure

116/68.

GENERAL:  Fairly nourished female in no acute distress.

HEENT:  Pupils equal, round and reactive.

PULMONARY:  Clear to auscultation bilaterally.

CARDIOVASCULAR:  Regular rhythm.  Normal S1, S2.

ABDOMEN:  Positive bowel sounds, soft.

SKIN:  No obvious rashes.



LABORATORY DATA:  Pertinent for white count of 11, hemoglobin and hematocrit of

12.3 and 37.3, platelet count of 395.  Chem-7 pertinent for sodium of 138,

potassium 4, chloride 108, CO2 18, BUN and creatinine of 31 and 2.1.  LFTs

within normal limits.  Urine tox positive for marijuana.



RADIOLOGY:  CT scan of abdomen and pelvis with contrast on 02/21/2020 showed no

significant pathology.



ASSESSMENT AND PLAN:  A 34-year-old black female presents with acute 3 days of

nausea, vomiting after a meal with a CT scan being benign and labs fairly stable

and urine tox positive for marijuana.  Most likely gastroenteritis.  The patient

does report still having some epigastric pain and so we will proceed with

conservative approach.



PLAN:

1.  Reviewed CT scan.

2.  Clear liquid diet, advance as tolerated.

3.  PPI daily.

4.  Tolerating p.o. in a.m., okay to discharge.  Follow up as an outpatient.

5.  If epigastric pain and symptoms worsen, we will consider EGD.

6.  We will follow.





DD: 02/22/2020 12:25

DT: 02/22/2020 14:45

JOB# [id number]  [id number]

CHRIS/NTS

## 2020-02-22 NOTE — CONSULTATION
History of Present Illness





- History of Present Illness


Thank you for the consultation


Patient was evaluated today


My assessment and plan are as follows


Renal failure likely acute in a patient who is 34-year-old and has been admitted

here with a creatinine of 3.5 potassium 4.3 and sodium 135, patient clinically 

appears to be volume depleted however we cannot rule out any possibility of 

underlying chronic kidney disease


Patient needs to have basic labs as well as renal imaging as well as urine 

studies upon discharge she will need to make a follow-up appointment in the 

office I have given her my contact information


Mild hyponatremia likely this is resulting from volume depletion to follow


Low-grade metabolic acidosis bicarbonate currently around 22 requires aggressive

hydration and follow-up


Nausea and vomiting etiology unclear: May benefit from GI evaluation


Renal prognosis remains guarded at this time


Adequately counseled regarding all the related issues all questions have been 

answered


We will continue to follow and make recommendation from renal standpoint


Author:


Claus Heart M.D.


[facility], [address]


[address]


[place] [address]


Tel; [phone]











Source of information: From patient 





History of present illness


34-year-old  female who has been admitted here with ongoing 

intermittent abdominal pain nausea and vomiting appetite has been very poor, 

patient says that she has been having issues with constipation, she has not 

taken any form of nonsteroidal drug, she was also feeling lightheaded upon 

standing, no prior history of any lupus hepatitis HIV or paraproteinemia as 

discussed with patient no history of any skin rash sore throat recently


Events of this hospitalization were noted, 


CT kidneys unremarkable 


Past medical history:





Current allergies: Reviewed from the current chart





Social history: Reviewed from the current chart





Family history: Reviewed from the current chart








Review of system:


Positive for


generalized weakness dizziness and dry mouth nausea vomiting ongoing for 3-4 

days


All other review of systems negative





Physical examination


Vitals: Reviewed


General: No acute distress


HEENT: Oral mucosa dry no pallor or icterus


Neck: Supple without any JVD thyromegaly or nodular mass


Chest: Clear to auscultation


Heart: Regular rate and rhythm S1-S2 heard no S3-S4


Abdomen: Soft nontender, bowel sounds present no renal bruit no suprapubic 

masses no CVA tenderness noted


Extremity: Minimal edema dry skin no peripheral cyanosis


Endocrine: Thyroid not enlarged


Psychiatric: No agitation and aggression noted


Musculoskeletal: No joint effusion noted








Labs and x-rays: Reviewed from this admission








Past History


Past Medical History: hypertension


Past Surgical History: No surgical history


Social history: smoking (Smokes occasionally), alcohol abuse (Alcohol 

occasionally)


Family history: cancer (Breast cancer runs in family)





Medications and Allergies


                                    Allergies











Allergy/AdvReac Type Severity Reaction Status Date / Time


 


No Known Allergies Allergy   Verified 02/21/20 19:46











                                Home Medications











 Medication  Instructions  Recorded  Confirmed  Last Taken  Type


 


No Known Home Medications [No  02/22/20 02/22/20 Unknown History





Reported Home Medications]     











Active Meds: 


Active Medications





Acetaminophen (Tylenol)  650 mg PO Q4H PRN


   PRN Reason: Pain MILD(1-3)/Fever >100.5/HA


Heparin Sodium (Porcine) (Heparin)  5,000 unit SUB-Q Q8HR HOMER


   Last Admin: 02/22/20 06:14 Dose:  5,000 unit


   Documented by: 


Sodium Chloride (Nacl 0.9% 1000 Ml)  1,000 mls @ 125 mls/hr IV AS DIRECT HOMER


   Last Admin: 02/22/20 01:16 Dose:  125 mls/hr


   Documented by: 


Magnesium Hydroxide (Milk Of Magnesia)  30 ml PO Q4H PRN


   PRN Reason: Constipation


Morphine Sulfate (Morphine)  2 mg IV Q4H PRN


   PRN Reason: Pain, Moderate (4-6)


   Last Admin: 02/22/20 06:17 Dose:  2 mg


   Documented by: 


Ondansetron HCl (Zofran)  4 mg IV Q8H PRN


   PRN Reason: Nausea And Vomiting


   Last Admin: 02/22/20 06:17 Dose:  4 mg


   Documented by: 


Sodium Chloride (Sodium Chloride Flush Syringe 10 Ml)  10 ml IV BID HOMER


Sodium Chloride (Sodium Chloride Flush Syringe 10 Ml)  10 ml IV PRN PRN


   PRN Reason: LINE FLUSH











Exam





- Vital Signs


Vital signs: 


                                   Vital Signs











Temp Pulse Resp BP Pulse Ox


 


 98.4 F   102 H  16   117/84   99 


 


 02/21/20 19:46  02/21/20 19:46  02/21/20 19:46  02/21/20 19:46  02/21/20 19:46














Results





- Lab Results





                                 02/22/20 09:03





                                 02/22/20 09:03


                             Most recent lab results











Calcium  8.9 mg/dL (8.4-10.2)   02/21/20  20:49

## 2020-02-22 NOTE — XRAY REPORT
CHEST 1 VIEW



INDICATION: 

jesse,left upper q pain.



COMPARISON: 

2/14/2018



FINDINGS:



Support devices: None.



Heart: Normal. 



Lungs/Pleura: No acute pulmonary or pleural findings.  







IMPRESSION:

1. No acute findings.

 



Signer Name: Niko Kenyon MD 

Signed: 2/22/2020 1:09 AM

 Workstation Name: [id number]

## 2020-02-22 NOTE — EMERGENCY DEPARTMENT REPORT
ED Abdominal Pain HPI





- General


Chief Complaint: Abdominal Pain


Stated Complaint: STOMACH PAIN NAUSEA CONSTIPATION


Time Seen by Provider: 02/21/20 21:10


Source: patient


Mode of arrival: Ambulatory


Limitations: No Limitations





- History of Present Illness


Initial Comments: 





patient presents with nausea, vomiting for the past 3 days. Feeling like passing

out, weak and dizzy. has decrease appetite and constipation. has not taken any 

meds for symptoms.


MD Complaint: abdominal pain


-: Gradual


Location: diffuse


Radiation: none


Migration to: no migration


Severity scale (0 -10): 10


Quality: cramping


Consistency: constant, intermittent


Improves With: nothing


Worsens With: nothing


Associated Symptoms: chills, constipation





- Related Data


                                    Allergies











Allergy/AdvReac Type Severity Reaction Status Date / Time


 


No Known Allergies Allergy   Verified 02/21/20 19:46














ED Review of Systems


ROS: 


Stated complaint: STOMACH PAIN NAUSEA CONSTIPATION


Other details as noted in HPI





Comment: All other systems reviewed and negative


Gastrointestinal: abdominal pain, nausea, vomiting





ED Past Medical Hx





- Past Medical History


Previous Medical History?: Yes


Hx Hypertension: Yes


Hx Arthritis: Yes





- Surgical History


Past Surgical History?: No





- Social History


Smoking Status: Current Every Day Smoker


Substance Use Type: None





ED Physical Exam





- General


Limitations: No Limitations


General appearance: alert, in no apparent distress





- Head


Head exam: Present: atraumatic, normocephalic





- Eye


Eye exam: Present: normal appearance





- ENT


ENT exam: Present: normal exam, normal orophraynx





- Neck


Neck exam: Present: normal inspection





- Respiratory


Respiratory exam: Present: normal lung sounds bilaterally





ED Course


                                   Vital Signs











  02/21/20 02/21/20





  19:46 22:48


 


Temperature 98.4 F 


 


Pulse Rate 102 H 


 


Respiratory 16 19





Rate  


 


Blood Pressure 117/84 


 


O2 Sat by Pulse 99 





Oximetry  














ED Medical Decision Making





- Lab Data


Result diagrams: 


                                 02/21/20 20:49





                                 02/21/20 20:49


Critical care attestation.: 


If time is entered above; I have spent that time in minutes in the direct care 

of this critically ill patient, excluding procedure time.








ED Disposition


Clinical Impression: 


 LOUIE (acute kidney injury), Severe dehydration





Disposition: DC-09 OP ADMIT IP TO THIS HOSP


Is pt being admited?: Yes


Does the pt Need Aspirin: No


Condition: Stable


Instructions:  Abdominal Pain (ED)


Referrals: 


PRIMARY CARE,MD [Primary Care Provider] - 3-5 Days

## 2020-02-22 NOTE — CAT SCAN REPORT
CT ABDOMEN AND PELVIS WITHOUT IV CONTRAST



INDICATION:

llq pain/ stone search.



COMPARISON:

None available.



TECHNIQUE:

All CT scans at this facility use dose modulation, automated exposure control, iterative reconstructi
on or weight based dosing, when appropriate, to reduce radiation dose to as low as reasonably achieva
ble.



FINDINGS:



Lung Bases: No significant abnormality.



Skeletal System: No acute abnormality.



ABDOMEN:

Liver: No significant abnormality.

Gallbladder: No significant abnormality.  

Bile Ducts: No significant abnormality.

Pancreas: No significant abnormality.

Spleen: No significant abnormality.

Adrenals: No significant abnormality.

Right Kidney: No significant abnormality.

Left Kidney: No significant abnormality.

Upper GI tract: No significant abnormality.

Lymph Nodes: No significant adenopathy.

Aorta: No significant abnormality. 

Additional Findings: No significant abnormality.



PELVIS:

Colon: No acute abnormality.

Urinary Bladder and Distal Ureters: No significant abnormality.

Appendix: No significant abnormality.  

Lymph Nodes: No significant adenopathy.

Additional Findings: Trace free fluid in the pelvis is likely physiologic. There is a 2.7 cm left ova
elver cyst which is likely physiologic.





IMPRESSION:

1.  Within the limitations of non contrast technique, no acute process in the abdomen or pelvis.

2.  Incidental findings, as above.



Signer Name: Niko Kenyon MD 

Signed: 2/21/2020 11:56 PM

 Workstation Name: [id number]

## 2020-02-23 LAB
APTT BLD: 38.3 SEC. (ref 24.2–36.6)
BASOPHILS # (AUTO): 0 K/MM3 (ref 0–0.1)
BASOPHILS NFR BLD AUTO: 0.2 % (ref 0–1.8)
BUN SERPL-MCNC: 11 MG/DL (ref 7–17)
BUN/CREAT SERPL: 4 %
CALCIUM SERPL-MCNC: 8.9 MG/DL (ref 8.4–10.2)
CREATININE,URINE: 45.1 MG/DL (ref 0.1–20)
EOSINOPHIL # BLD AUTO: 0 K/MM3 (ref 0–0.4)
EOSINOPHIL NFR BLD AUTO: 0.2 % (ref 0–4.3)
HCT VFR BLD CALC: 41.2 % (ref 30.3–42.9)
HEMOLYSIS INDEX: 7
HGB BLD-MCNC: 13.6 GM/DL (ref 10.1–14.3)
INR PPP: 0.97 (ref 0.87–1.13)
LYMPHOCYTES # BLD AUTO: 1.4 K/MM3 (ref 1.2–5.4)
LYMPHOCYTES NFR BLD AUTO: 14.2 % (ref 13.4–35)
MCHC RBC AUTO-ENTMCNC: 33 % (ref 30–34)
MCV RBC AUTO: 90 FL (ref 79–97)
MONOCYTES # (AUTO): 0.8 K/MM3 (ref 0–0.8)
MONOCYTES % (AUTO): 8 % (ref 0–7.3)
PLATELET # BLD: 417 K/MM3 (ref 140–440)
RBC # BLD AUTO: 4.6 M/MM3 (ref 3.65–5.03)

## 2020-02-23 RX ADMIN — HEPARIN SODIUM SCH UNIT: 5000 INJECTION, SOLUTION INTRAVENOUS; SUBCUTANEOUS at 05:57

## 2020-02-23 RX ADMIN — Medication SCH ML: at 22:20

## 2020-02-23 RX ADMIN — SODIUM CHLORIDE SCH MLS/HR: 0.9 INJECTION, SOLUTION INTRAVENOUS at 10:42

## 2020-02-23 RX ADMIN — ONDANSETRON PRN MG: 2 INJECTION INTRAMUSCULAR; INTRAVENOUS at 15:44

## 2020-02-23 RX ADMIN — SODIUM CHLORIDE SCH MLS/HR: 0.9 INJECTION, SOLUTION INTRAVENOUS at 15:44

## 2020-02-23 RX ADMIN — HEPARIN SODIUM SCH UNIT: 5000 INJECTION, SOLUTION INTRAVENOUS; SUBCUTANEOUS at 14:00

## 2020-02-23 RX ADMIN — HEPARIN SODIUM SCH: 5000 INJECTION, SOLUTION INTRAVENOUS; SUBCUTANEOUS at 22:20

## 2020-02-23 RX ADMIN — ONDANSETRON PRN MG: 2 INJECTION INTRAMUSCULAR; INTRAVENOUS at 10:41

## 2020-02-23 RX ADMIN — Medication SCH ML: at 10:42

## 2020-02-23 RX ADMIN — SODIUM CHLORIDE SCH MLS/HR: 0.9 INJECTION, SOLUTION INTRAVENOUS at 22:20

## 2020-02-23 NOTE — ULTRASOUND REPORT
ULTRASOUND RENAL



INDICATION: renal failure



COMPARISON: No relevant prior imaging study available. 



FINDINGS:



RIGHT KIDNEY: Size: 11.1 cm. 

Echogenicity: Normal. Cortical thickness: Mild areas of focal thinning.

Stones: None. 

Hydronephrosis: None. 

Cyst or mass: None.  



LEFT KIDNEY: Size: 13.5 cm. 

Echogenicity: Normal. Cortical thickness: Normal. 

Stones: None. 

Hydronephrosis: None. 

Cyst or mass: None.  



Urinary Bladder: No significant abnormality.

Free Fluid: None.

Additional Findings: None.



IMPRESSION: No acute sonographic abnormality of the kidneys



Signer Name: Jose Carlos Lew MD 

Signed: 2/23/2020 1:21 PM

 Workstation Name: [id number]

## 2020-02-23 NOTE — PROGRESS NOTE
Assessment and Plan


Assessment and plan: 


34 year old  female seen in the ER c/o nausea and vomiting for 

three days. She has had some constipation .She denies any fever, no chills, no 

abdominal pain, no dysuria or hematuria.


She states she ate some chicken at a restaurant few days ago and subsequently 

started having nausea and vomiting. She denies any sick contacts, no recent 

travels. Evaluation in the ER including CT abdomen did not reveal any 

significant abnormalities. BUN/Creatinine was however elevate





* Patient continues to have Epigastric abdominal pain, has not been able to 

  tolerate PO intake and still no BM. 


* She initially denied use of Marijuana but when advised of UDS, said she took 

  it from the sister after having abdominal pain, nausea and vomiting x 3 days 

  and her sister said to try it and it will work. she states it calmed her 

  nerves. she denies any fever


* Increase IVF to 150cc an hr, creatinine still 3.1. She denies any past renal 

  disease. She although reports hx of HTN


* Give lactulose


* LFTs are normal.


* GI consulted. 








2/23: IV fluids adjusted yesterday creatinine only came down to 2.8 discussed wi

th nephrologist recommended given a bolus of fluid and increasing to 200 cc an 

hour as patient is significantly dehydrated.  Patient did have one episode of 

bowel movement yesterday.  Awaiting GI evaluation and anticipate discharge in 

a.m.





LOUIE Secondary to vasomotor nephropathy


Gastroenteritis severe


Dehydration


Abdominal pain- Epigastric-Etiology unknown


Constipation


Persistent Nausea and Vomiting


Polysubstance abuse. 


Acute Metabolic Acidosis





History


Interval history: 


Patient seen and examined remains in pain with epigastric pain 6 out of 10 in 

intensity still nauseated but keeping clear liquids down.





Hospitalist Physical





- Physical exam


Narrative exam: 


VITAL SIGNS:  Reviewed.    


GENERAL:  The patient appears normally developed, Vital signs as documented.


HEAD:  No signs of head trauma.


EYES:  Pupils are equal.  Extraocular motions intact.  


EARS:  Hearing grossly intact.


MOUTH:  Oropharynx is normal. 


NECK:  No adenopathy, no JVD.  


CHEST:  Chest with clear breath sounds bilaterally.  No wheezes, rales, or 

rhonchi.  


CARDIAC:  Regular rate and rhythm.  S1 and S2, without murmurs, gallops, or 

rubs.


VASCULAR:  No Edema.  Peripheral pulses normal and equal in all extremities.


ABDOMEN:  Soft, epigastric tender.  And non distended.  No   rebound or 

guarding, and no masses palpated.   Bowel Sounds normal.


MUSCULOSKELETAL:  Good range of motion of all major joints. Extremities without 

clubbing, cyanosis or edema.  


NEUROLOGIC EXAM:  Alert and oriented x 3   No focal sensory or strength 

deficits.   Speech normal.  Follows commands.


PSYCHIATRIC:  Mood normal.


SKIN: Multiple tattoos.  Detail exam as documented in skin assessment








- Constitutional


Vitals: 


                                        











Temp Pulse Resp BP Pulse Ox


 


 98.2 F   63   20   123/79   100 


 


 02/23/20 04:21  02/23/20 04:21  02/23/20 04:21  02/23/20 04:21  02/23/20 04:21











General appearance: Present: no acute distress, well-nourished





Results





- Labs


CBC & Chem 7: 


                                 02/23/20 07:27





                                 02/23/20 07:27


Labs: 


                             Laboratory Last Values











WBC  9.6 K/mm3 (4.5-11.0)   02/23/20  07:27    


 


RBC  4.60 M/mm3 (3.65-5.03)   02/23/20  07:27    


 


Hgb  13.6 gm/dl (10.1-14.3)   02/23/20  07:27    


 


Hct  41.2 % (30.3-42.9)   02/23/20  07:27    


 


MCV  90 fl (79-97)   02/23/20  07:27    


 


MCH  30 pg (28-32)   02/23/20  07:27    


 


MCHC  33 % (30-34)   02/23/20  07:27    


 


RDW  15.0 % (13.2-15.2)   02/23/20  07:27    


 


Plt Count  417 K/mm3 (140-440)   02/23/20  07:27    


 


Lymph % (Auto)  14.2 % (13.4-35.0)   02/23/20  07:27    


 


Mono % (Auto)  8.0 % (0.0-7.3)  H  02/23/20  07:27    


 


Eos % (Auto)  0.2 % (0.0-4.3)   02/23/20  07:27    


 


Baso % (Auto)  0.2 % (0.0-1.8)   02/23/20  07:27    


 


Lymph #  1.4 K/mm3 (1.2-5.4)   02/23/20  07:27    


 


Mono #  0.8 K/mm3 (0.0-0.8)   02/23/20  07:27    


 


Eos #  0.0 K/mm3 (0.0-0.4)   02/23/20  07:27    


 


Baso #  0.0 K/mm3 (0.0-0.1)   02/23/20  07:27    


 


Seg Neutrophils %  77.4 % (40.0-70.0)  H  02/23/20  07:27    


 


Seg Neutrophils #  7.4 K/mm3 (1.8-7.7)   02/23/20  07:27    


 


PT  13.0 Sec. (12.2-14.9)   02/23/20  07:27    


 


INR  0.97  (0.87-1.13)   02/23/20  07:27    


 


APTT  38.3 Sec. (24.2-36.6)  H  02/23/20  07:27    


 


Sodium  139 mmol/L (137-145)   02/23/20  07:27    


 


Potassium  4.1 mmol/L (3.6-5.0)   02/23/20  07:27    


 


Chloride  105.4 mmol/L ()   02/23/20  07:27    


 


Carbon Dioxide  20 mmol/L (22-30)  L  02/23/20  07:27    


 


Anion Gap  18 mmol/L  02/23/20  07:27    


 


BUN  11 mg/dL (7-17)   02/23/20  07:27    


 


Creatinine  2.8 mg/dL (0.7-1.2)  H  02/23/20  07:27    


 


Estimated GFR  23 ml/min  02/23/20  07:27    


 


BUN/Creatinine Ratio  4 %  02/23/20  07:27    


 


Glucose  104 mg/dL ()  H  02/23/20  07:27    


 


Osmolality  281 Mosm/kg  02/22/20  13:30    


 


Uric Acid  6.2 mg/dL (3.5-7.6)   02/22/20  13:30    


 


Calcium  8.9 mg/dL (8.4-10.2)   02/23/20  07:27    


 


Total Bilirubin  0.30 mg/dL (0.1-1.2)   02/21/20  20:49    


 


AST  16 units/L (5-40)   02/21/20  20:49    


 


ALT  8 units/L (7-56)   02/21/20  20:49    


 


Alkaline Phosphatase  61 units/L ()   02/21/20  20:49    


 


Total Protein  6.5 g/dL (6.3-8.2)   02/21/20  20:49    


 


Albumin  3.6 g/dL (3.9-5)  L  02/21/20  20:49    


 


Albumin/Globulin Ratio  1.2 %  02/21/20  20:49    


 


Lipase  55 units/L (13-60)   02/21/20  20:49    


 


HCG, Qual  Negative  (Negative)   02/21/20  20:49    


 


Urine Color  Yellow  (Yellow)   02/21/20  20:52    


 


Urine Turbidity  Clear  (Clear)   02/21/20  20:52    


 


Urine pH  5.0  (5.0-7.0)   02/21/20  20:52    


 


Ur Specific Gravity  1.011  (1.003-1.030)   02/21/20  20:52    


 


Urine Protein  <15 mg/dl mg/dL (Negative)   02/21/20  20:52    


 


Urine Glucose (UA)  Neg mg/dL (Negative)   02/21/20  20:52    


 


Urine Ketones  Neg mg/dL (Negative)   02/21/20  20:52    


 


Urine Blood  Neg  (Negative)   02/21/20  20:52    


 


Urine Nitrite  Neg  (Negative)   02/21/20  20:52    


 


Urine Bilirubin  Neg  (Negative)   02/21/20  20:52    


 


Urine Urobilinogen  < 2.0 mg/dL (<2.0)   02/21/20  20:52    


 


Ur Leukocyte Esterase  Neg  (Negative)   02/21/20  20:52    


 


Urine WBC (Auto)  1.0 /HPF (0.0-6.0)   02/21/20  20:52    


 


Urine RBC (Auto)  2.0 /HPF (0.0-6.0)   02/21/20  20:52    


 


U Epithel Cells (Auto)  3.0 /HPF (0-13.0)   02/21/20  20:52    


 


Urine Bacteria (Auto)  2+ /HPF (Negative)   02/21/20  20:52    


 


Urine Mucus  Few /HPF  02/21/20  20:52    


 


Urine Creatinine  45.1 mg/dL (0.1-20.0)  H  02/22/20  Unknown


 


Urine Sodium  94 mmol/L  02/22/20  Unknown


 


Urine Total Protein  5 mg/dL (5-11.8)   02/22/20  Unknown


 


Urine Opiates Screen  Presumptive negative   02/22/20  01:02    


 


Urine Methadone Screen  Presumptive negative   02/22/20  01:02    


 


Ur Barbiturates Screen  Presumptive negative   02/22/20  01:02    


 


Ur Phencyclidine Scrn  Presumptive negative   02/22/20  01:02    


 


Ur Amphetamines Screen  Presumptive positive   02/22/20  01:02    


 


U Benzodiazepines Scrn  Presumptive negative   02/22/20  01:02    


 


Urine Cocaine Screen  Presumptive negative   02/22/20  01:02    


 


U Marijuana (THC) Screen  Presumptive positive   02/22/20  01:02    


 


Drugs of Abuse Note  Disclamer   02/22/20  01:02    


 


Plasma/Serum Alcohol  < 0.01 % (0-0.07)   02/22/20  00:59    














Active Medications





- Current Medications


Current Medications: 














Generic Name Dose Route Start Last Admin





  Trade Name Freq  PRN Reason Stop Dose Admin


 


Acetaminophen  650 mg  02/22/20 00:49 





  Tylenol  PO  





  Q4H PRN  





  Pain MILD(1-3)/Fever >100.5/HA  


 


Heparin Sodium (Porcine)  5,000 unit  02/22/20 06:00  02/23/20 05:57





  Heparin  SUB-Q   5,000 unit





  Q8HR HOMER   Administration


 


Sodium Chloride  1,000 mls @ 200 mls/hr  02/22/20 01:00  02/22/20 23:00





  Nacl 0.9% 1000 Ml  IV   125 mls/hr





  AS DIRECT HOMER   Administration


 


Sodium Chloride  1,000 mls @ 0 mls/hr  02/23/20 10:30 





  Nacl 0.9% 1000 Ml  IV  02/24/20 10:31 





  ONCE HOMER  





  As Directed  


 


Magnesium Hydroxide  30 ml  02/22/20 00:49 





  Milk Of Magnesia  PO  





  Q4H PRN  





  Constipation  


 


Morphine Sulfate  2 mg  02/22/20 00:49  02/22/20 22:46





  Morphine  IV   2 mg





  Q4H PRN   Administration





  Pain, Moderate (4-6)  


 


Ondansetron HCl  4 mg  02/22/20 09:22  02/22/20 22:46





  Zofran  IV   4 mg





  Q4H PRN   Administration





  Nausea And Vomiting  


 


Sodium Chloride  10 ml  02/22/20 10:00  02/22/20 22:47





  Sodium Chloride Flush Syringe 10 Ml  IV   10 ml





  BID HOMER   Administration


 


Sodium Chloride  10 ml  02/22/20 00:49 





  Sodium Chloride Flush Syringe 10 Ml  IV  





  PRN PRN  





  LINE FLUSH

## 2020-02-23 NOTE — PROGRESS NOTE
Subjective


Interval history: 


Patient was seen today for follow-up of multiple renal related issues


admitted with nausea vomiting


Still feeling very dry in her mouth


Interdisciplinary notes that also reviewed


Events of 24 hours vitals labs intake output medications were reviewed








Past medical history: Reviewed


Family history: Reviewed


Social history: Reviewed


Allergies: Reviewed








Physical examination:


Vitals: Reviewed


HEENT: No pallor or icterus oral mucosa moist 


Neck: Supple no JVD no thyromegaly


Chest: Bilateral clear to auscultation anteriorly


Heart: Regular rate and rhythm S1-S2 heard no S3-S4


Abdomen: Soft nontender no voluntary guarding rigidity rebound


Extremity: Dry skin less than 1+ peripheral edema


Psychiatric: No evidence of agitation and aggression noted


Dermatology: No petechial rashes





Labs and x-rays: Reviewed from today





Assessment and plan


Acute renal failure: Patient's labs currently pending she is in need for aggr

essive hydration, discussed with hospital medicine service


Follow up on the labs no emergent indication for renal replacement therapy 

mostly appeared to be volume depleted


follow-up pendingUltrasound


Adequately counseled and educated regarding renal related issues


She will need a follow-up appointment in the office upon discharge


All questions were answered and simple English


We'll continue to follow and make recommendation for renal standpoint








Objective





- Vital Signs


Vital signs: 


                               Vital Signs - 12hr











  02/22/20 02/22/20 02/22/20





  22:00 22:46 23:16


 


Temperature   


 


Pulse Rate   


 


Respiratory 18 18 18





Rate   


 


Respiratory 18  





Rate [Abdomen]   


 


Blood Pressure   


 


O2 Sat by Pulse   





Oximetry   














  02/23/20 02/23/20





  00:14 04:21


 


Temperature 98.2 F 98.2 F


 


Pulse Rate 55 L 63


 


Respiratory 18 20





Rate  


 


Respiratory  





Rate [Abdomen]  


 


Blood Pressure 109/59 123/79


 


O2 Sat by Pulse 100 100





Oximetry  














- Lab





                                 02/23/20 07:27





                                 02/23/20 07:27


                             Most recent lab results











Calcium  7.9 mg/dL (8.4-10.2)  L  02/22/20  09:03    


 


Urine Creatinine  45.1 mg/dL (0.1-20.0)  H  02/22/20  Unknown


 


Urine Sodium  94 mmol/L  02/22/20  Unknown


 


Urine Total Protein  5 mg/dL (5-11.8)   02/22/20  Unknown














Medications & Allergies





- Medications


Allergies/Adverse Reactions: 


                                    Allergies





No Known Allergies Allergy (Verified 02/21/20 19:46)


   








Home Medications: 


                                Home Medications











 Medication  Instructions  Recorded  Confirmed  Last Taken  Type


 


No Known Home Medications [No  02/22/20 02/22/20 Unknown History





Reported Home Medications]     











Active Medications: 














Generic Name Dose Route Start Last Admin





  Trade Name Allyssa  PRN Reason Stop Dose Admin


 


Acetaminophen  650 mg  02/22/20 00:49 





  Tylenol  PO  





  Q4H PRN  





  Pain MILD(1-3)/Fever >100.5/HA  


 


Heparin Sodium (Porcine)  5,000 unit  02/22/20 06:00  02/23/20 05:57





  Heparin  SUB-Q   5,000 unit





  Q8HR HOMER   Administration


 


Sodium Chloride  1,000 mls @ 150 mls/hr  02/22/20 01:00  02/22/20 23:00





  Nacl 0.9% 1000 Ml  IV   125 mls/hr





  AS DIRECT HOMER   Administration


 


Magnesium Hydroxide  30 ml  02/22/20 00:49 





  Milk Of Magnesia  PO  





  Q4H PRN  





  Constipation  


 


Morphine Sulfate  2 mg  02/22/20 00:49  02/22/20 22:46





  Morphine  IV   2 mg





  Q4H PRN   Administration





  Pain, Moderate (4-6)  


 


Ondansetron HCl  4 mg  02/22/20 09:22  02/22/20 22:46





  Zofran  IV   4 mg





  Q4H PRN   Administration





  Nausea And Vomiting  


 


Sodium Chloride  10 ml  02/22/20 10:00  02/22/20 22:47





  Sodium Chloride Flush Syringe 10 Ml  IV   10 ml





  BID HOMER   Administration


 


Sodium Chloride  10 ml  02/22/20 00:49 





  Sodium Chloride Flush Syringe 10 Ml  IV  





  PRN PRN  





  LINE FLUSH

## 2020-02-23 NOTE — GASTROENTEROLOGY PROGRESS NOTE
Assessment and Plan





Patient still with severe abdominal pain nausea vomiting despite supportive 

measures.  Therefore we will tentatively plan for EGD tomorrow for further 

evaluation to rule out acute peptic ulcer disease as her symptoms are persisting

and not improving as would have been expected if this was merely acute 

gastroenteritis such as from food poisoning





- Patient Problems


(1) Epigastric abdominal pain


Current Visit: Yes   Status: Acute   





(2) Nausea & vomiting


Current Visit: Yes   Status: Acute   





Subjective


Date of service: 02/23/20


Principal diagnosis: abd pain, N/V


Interval history: 





Patient reports still with severe abdominal pain, 10 out of 10, cramping, 

constant, worse with palpation better with nothing not even morphine.  No 

radiation, associated with nausea and vomiting


Not improving with current meds





Objective





- Constitutional


Vitals: 


                                        











Temp Pulse Resp BP Pulse Ox


 


 98.2 F   63   20   123/79   100 


 


 02/23/20 04:21  02/23/20 04:21  02/23/20 04:21  02/23/20 04:21  02/23/20 04:21











General appearance: other (Patient uncomfortable)





- EENT


ENT: hearing intact





- Neck


Neck: supple





- Respiratory


Respiratory effort: normal





- Cardiovascular


Rhythm: regular





- Gastrointestinal


General gastrointestinal: Present: soft, tender





- Integumentary


Integumentary: Present: warm, dry





- Neurologic


Neurological: alert and oriented x3





- Psychiatric


Psychiatric: appropriate mood/affect





- Labs


CBC & Chem 7: 


                                 02/23/20 07:27





                                 02/23/20 07:27


Labs: 


                         Laboratory Results - last 24 hr











  02/22/20 02/22/20 02/22/20





  13:30 13:30 Unknown


 


WBC   


 


RBC   


 


Hgb   


 


Hct   


 


MCV   


 


MCH   


 


MCHC   


 


RDW   


 


Plt Count   


 


Lymph % (Auto)   


 


Mono % (Auto)   


 


Eos % (Auto)   


 


Baso % (Auto)   


 


Lymph #   


 


Mono #   


 


Eos #   


 


Baso #   


 


Seg Neutrophils %   


 


Seg Neutrophils #   


 


PT   


 


INR   


 


APTT   


 


Sodium   


 


Potassium   


 


Chloride   


 


Carbon Dioxide   


 


Anion Gap   


 


BUN   


 


Creatinine   


 


Estimated GFR   


 


BUN/Creatinine Ratio   


 


Glucose   


 


Osmolality  281  


 


Uric Acid   6.2 


 


Calcium   


 


Urine Creatinine    45.1 H


 


Urine Sodium    94


 


Urine Total Protein    5














  02/23/20 02/23/20 02/23/20





  07:27 07:27 07:27


 


WBC  9.6  


 


RBC  4.60  


 


Hgb  13.6  


 


Hct  41.2  


 


MCV  90  


 


MCH  30  


 


MCHC  33  


 


RDW  15.0  


 


Plt Count  417  


 


Lymph % (Auto)  14.2  


 


Mono % (Auto)  8.0 H  


 


Eos % (Auto)  0.2  


 


Baso % (Auto)  0.2  


 


Lymph #  1.4  


 


Mono #  0.8  


 


Eos #  0.0  


 


Baso #  0.0  


 


Seg Neutrophils %  77.4 H  


 


Seg Neutrophils #  7.4  


 


PT   13.0 


 


INR   0.97 


 


APTT   38.3 H 


 


Sodium    139


 


Potassium    4.1


 


Chloride    105.4


 


Carbon Dioxide    20 L


 


Anion Gap    18


 


BUN    11


 


Creatinine    2.8 H


 


Estimated GFR    23


 


BUN/Creatinine Ratio    4


 


Glucose    104 H


 


Osmolality   


 


Uric Acid   


 


Calcium    8.9


 


Urine Creatinine   


 


Urine Sodium   


 


Urine Total Protein

## 2020-02-24 VITALS — SYSTOLIC BLOOD PRESSURE: 194 MMHG | DIASTOLIC BLOOD PRESSURE: 88 MMHG

## 2020-02-24 LAB
BUN SERPL-MCNC: 9 MG/DL (ref 7–17)
BUN/CREAT SERPL: 3 %
CALCIUM SERPL-MCNC: 8.9 MG/DL (ref 8.4–10.2)
HEMOLYSIS INDEX: 22

## 2020-02-24 RX ADMIN — HEPARIN SODIUM SCH UNIT: 5000 INJECTION, SOLUTION INTRAVENOUS; SUBCUTANEOUS at 13:53

## 2020-02-24 RX ADMIN — HEPARIN SODIUM SCH: 5000 INJECTION, SOLUTION INTRAVENOUS; SUBCUTANEOUS at 05:21

## 2020-02-24 RX ADMIN — Medication SCH ML: at 09:24

## 2020-02-24 NOTE — OPERATIVE REPORT
Operative Report


Operative Report: 





DOS: 2/24/2020





SURGEON: Sachin Butcher MD





EGD with biopsy REPORT





PREOPERATIVE DIAGNOSIS and POSTOPERATIVE DIAGNOSIS: Intractable nausea vomiting 

and abdominal pain





ESTIMATED BLOOD LOSS: Minimal





DESCRIPTION OF PROCEDURE: A high-resolution EGD scope was passed through the 

oropharynx, esophagus, stomach, and second portion of duodenum. The scope was 

carefully withdrawn. Retroflexion was performed in the stomach. At the end of 

the procedure, the scope was cleaned using normal technique. Vital signs monitor

ed continuously throughout.





SEDATION: Provided by [facility].





COMPLICATIONS: None.





FINDINGS: 


* Normal second portion of the duodenum


* 2 ulcers in the duodenal bulb.  First ulcer approximately 6 mm in diameter 

  clean based nonbleeding.  Second ulcer approximately 1 cm in diameter slightly

  excavated with erythema and edema no high risk stigmata seen.  Biopsies 

  obtained from the edges of the ulcer rule out dysplasia


* Moderate to severe gastritis of the gastric antrum and body with multiple 

  superficial erosions in the gastric antrum and nodularity and erythema of the 

  gastric body.  Biopsies were taken to rule out H. Pylori infection.  A total 

  of 5 biopsies were taken, 2 from the antrum, 1 from the incisura, 2 from the 

  body.


* GE junction located 38 cm from incisors


* Minimal reflux esophagitis in the distal esophagus at the level of the GE 

  junction


* Remainder of the exam was normal





RECOMMENDATIONS: 


* Follow-up biopsy results


* Starting patient on IV twice daily Protonix as well as Carafate to treat the 

  duodenal ulcer and gastric inflammation which is likely the source of her 

  symptoms


* I am starting her on a liquid diet diet once patient is tolerating diet she 

  may advance as tolerated

## 2020-02-24 NOTE — DISCHARGE SUMMARY
Providers





- Providers


Date of Admission: 


02/22/20 00:38





Attending physician: 


MANISHA SAGASTUME MD





                                        





02/22/20 00:49


Consult to Physician [CONS] Routine 


   Comment: 


   Consulting Provider: BRAULIO ROONEY


   Physician Instructions: 


   Reason For Exam: ACUTE KIDNEY INJURY





02/22/20 09:23


Consult to Physician [CONS] Routine 


   Comment: 


   Consulting Provider: DEENA OMALLEY


   Physician Instructions: 


   Reason For Exam: persistent nausea with vomiting











Primary care physician: 


PRIMARY CARE MD








Hospitalization


Reason for admission: abdominal pain


Condition: Stable


Hospital course: 


34 year old  female seen in the ER c/o nausea and vomiting for 

three days. She has had some constipation .She denies any fever, no chills, no 

abdominal pain, no dysuria or hematuria.


She states she ate some chicken at a restaurant few days ago and subsequently 

started having nausea and vomiting. She denies any sick contacts, no recent 

travels. Evaluation in the ER including CT abdomen did not reveal any 

significant abnormalities. BUN/Creatinine was however elevate








2/23: IV fluids adjusted yesterday creatinine only came down to 2.8 discussed 

with nephrologist recommended given a bolus of fluid and increasing to 200 cc an

 hour as patient is significantly dehydrated.  Patient did have one episode of 

bowel movement yesterday.  Awaiting GI evaluation and anticipate discharge in 

a.m.


224: Patient underwent endoscopy with the following findings


FINDINGS: 


* Normal second portion of the duodenum


* 2 ulcers in the duodenal bulb.  First ulcer approximately 6 mm in diameter 

  clean based nonbleeding.  Second ulcer approximately 1 cm in diameter slightly

   excavated with erythema and edema no high risk stigmata seen.  Biopsies 

  obtained from the edges of the ulcer rule out dysplasia


* Moderate to severe gastritis of the gastric antrum and body with multiple 

  superficial erosions in the gastric antrum and nodularity and erythema of the 

  gastric body.  Biopsies were taken to rule out H. Pylori infection.  A total 

  of 5 biopsies were taken, 2 from the antrum, 1 from the incisura, 2 from the 

  body.


* GE junction located 38 cm from incisors


* Minimal reflux esophagitis in the distal esophagus at the level of the GE 

  junction


* Remainder of the exam was normal





RECOMMENDATIONS: 


* Follow-up biopsy results


* Starting patient on IV twice daily Protonix as well as Carafate to treat the 

  duodenal ulcer and gastric inflammation which is likely the source of her 

  symptoms


* I am starting her on a liquid diet diet once patient is tolerating diet she 

  may advance as tolerated


She currently is tolerating diet and can be discharged I did send all her 

medications to her pharmacy.  I discussed with the patient the need to follow-up

 with GI for pathology report and in a timely manner.  She verbalized 

understanding.  She did not exhibit any symptomatic bradycardia repeat heart 

rate was in the 60s.





LOUIE Secondary to vasomotor nephropathy


Gastroenteritis severe


Peptic ulcer disease


Dehydration


Abdominal pain- Epigastric-Etiology unknown


Constipation


Persistent Nausea and Vomiting


Polysubstance abuse. 


Acute Metabolic Acidosis


Sinus Bradycarda


Tobacco and THC use disorder

















Disposition: DC-01 TO HOME OR SELFCARE


Time spent for discharge: 35 mins





Core Measure Documentation





- Palliative Care


Palliative Care/ Comfort Measures: Not Applicable





- Core Measures


Any of the following diagnoses?: none





Exam





- Physical Exam


Narrative exam: 


VITAL SIGNS:  Reviewed.    


GENERAL:  The patient appears normally developed, Vital signs as documented.


HEAD:  No signs of head trauma.


EYES:  Pupils are equal.  Extraocular motions intact.  


EARS:  Hearing grossly intact.


MOUTH:  Oropharynx is normal. 


NECK:  No adenopathy, no JVD.  


CHEST:  Chest with clear breath sounds bilaterally.  No wheezes, rales, or 

rhonchi.  


CARDIAC:  Regular rate and rhythm.  S1 and S2, without murmurs, gallops, or 

rubs.


VASCULAR:  No Edema.  Peripheral pulses normal and equal in all extremities.


ABDOMEN:  Soft, epigastric tender.  And non distended.  No   rebound or 

guarding, and no masses palpated.   Bowel Sounds normal.


MUSCULOSKELETAL:  Good range of motion of all major joints. Extremities without 

clubbing, cyanosis or edema.  


NEUROLOGIC EXAM:  Alert and oriented x 3   No focal sensory or strength 

deficits.   Speech normal.  Follows commands.


PSYCHIATRIC:  Mood normal.


SKIN: Multiple tattoos.  Detail exam as documented in skin assessment








- Constitutional


Vitals: 


                                        











Temp Pulse Resp BP Pulse Ox


 


 98.9 F   49 L  13   141/77   100 


 


 02/24/20 12:13  02/24/20 12:43  02/24/20 12:43  02/24/20 12:43  02/24/20 12:43














Plan


Activity: advance as tolerated, fall precautions


Diet: advance as tolerated


Special Instructions: record daily BP diary, smoking cessation


Follow up with: 


PRIMARY CARE,MD [Primary Care Provider] - 3-5 Days


RADHA GAONA MD [Staff Physician] - 7 Days


Prescriptions: 


Sucralfate [Carafate] 1 gm PO ACHS #100 ml


Pantoprazole [Protonix TAB] 40 mg PO BID #60 tablet

## 2020-02-24 NOTE — ANESTHESIA CONSULTATION
<FINA BARROS - Last Filed: 02/24/20 09:46>





Anesthesia Consult and Med Hx


Date of service: 02/24/20





- Pulmonary Exam


CTA: Yes





- Cardiac Exam


Cardiac Exam: RRR





- Pre-Operative Health Status


ASA Pre-Surgery Classification: ASA2


Proposed Anesthetic Plan: General, MAC





- Pulmonary


Hx Smoking: Yes (occasional)


Hx Asthma: No


COPD: No


Hx Pneumonia: No





- Cardiovascular System


Hx Hypertension: Yes


Hx Coronary Artery Disease: No





- Central Nervous System


Hx Neuromuscular Disorder: No





- Gastrointestinal


Hx Ulcer: No (severe abd. pain, Nausea, vomiting, and constipation)





- Endocrine


Hx End Stage Renal Disease: No (recent inc BUN / Cr with present illness)





- Other Systems


Hx Alcohol Use: Yes (occasional)


Hx Cancer: No





<NATALY SHIPLEY - Last Filed: 02/24/20 10:43>





Anesthesia Consult and Med Hx





- Airway


Anesthetic Teeth Evaluation: Good


ROM Head & Neck: Adequate


Mental/Hyoid Distance: Adequate


Mallampati Class: Class III


Intubation Access Assessment: Possibly Difficult





- Additional Comments


Anesthesia Medical History Comments: No hx anesthetic complications. No vomiting

today.

## 2020-02-24 NOTE — PROGRESS NOTE
Assessment and Plan





Assessment:


* Nonoliguric LOUIE secondary to prerenal azotemia due to volume depletion


   --SCr 0.8mg/dL in Apr 2018


* Nausea/vomiting


* Metabolic acidosis





Plan:


* Renal function improving w/ conservative management. No indication for renal 

  replacement therapy at this time


* Continue IVF at current rate


* UOP not documented - discussed w/ patient's RN


* Strict I/O ordered


* AM labs





Subjective


Date of service: 02/24/20


Principal diagnosis: abd pain, N/V


Interval history: 





Patient is off the floor at time of visit.  Chart reviewed including RN/Provider

notes, vitals, labs and medications





Objective





- Exam


Narrative Exam: 





Deferred - off the floor at time of visit.





- Vital Signs


Vital signs: 


                               Vital Signs - 12hr











  02/23/20 02/24/20





  23:51 06:04


 


Temperature 98.9 F 97.9 F


 


Pulse Rate 85 51 L


 


Respiratory 16 16





Rate  


 


Blood Pressure 129/65 108/55


 


O2 Sat by Pulse 100 98





Oximetry  














- Lab





                                 02/23/20 07:27





                                 02/24/20 08:08


                             Most recent lab results











Calcium  8.9 mg/dL (8.4-10.2)   02/24/20  08:08    


 


Urine Creatinine  45.1 mg/dL (0.1-20.0)  H  02/22/20  Unknown


 


Urine Sodium  94 mmol/L  02/22/20  Unknown


 


Urine Total Protein  5 mg/dL (5-11.8)   02/22/20  Unknown














Medications & Allergies





- Medications


Allergies/Adverse Reactions: 


                                    Allergies





No Known Allergies Allergy (Verified 02/21/20 19:46)


   








Home Medications: 


                                Home Medications











 Medication  Instructions  Recorded  Confirmed  Last Taken  Type


 


No Known Home Medications [No  02/22/20 02/22/20 Unknown History





Reported Home Medications]     











Active Medications: 














Generic Name Dose Route Start Last Admin





  Trade Name Freq  PRN Reason Stop Dose Admin


 


Acetaminophen  650 mg  02/22/20 00:49 





  Tylenol  PO  





  Q4H PRN  





  Pain MILD(1-3)/Fever >100.5/HA  


 


Heparin Sodium (Porcine)  5,000 unit  02/22/20 06:00  02/24/20 05:21





  Heparin  SUB-Q   Not Given





  Q8HR HOMER  


 


Sodium Chloride  1,000 mls @ 200 mls/hr  02/22/20 01:00  02/23/20 22:20





  Nacl 0.9% 1000 Ml  IV   125 mls/hr





  AS DIRECT HOMER   Administration


 


Sodium Chloride  1,000 mls @ 0 mls/hr  02/23/20 10:30 





  Nacl 0.9% 1000 Ml  IV  02/24/20 10:31 





  ONCE HOMER  





  As Directed  


 


Magnesium Hydroxide  30 ml  02/22/20 00:49 





  Milk Of Magnesia  PO  





  Q4H PRN  





  Constipation  


 


Morphine Sulfate  2 mg  02/22/20 00:49  02/22/20 22:46





  Morphine  IV   2 mg





  Q4H PRN   Administration





  Pain, Moderate (4-6)  


 


Ondansetron HCl  4 mg  02/22/20 09:22  02/23/20 15:44





  Zofran  IV   4 mg





  Q4H PRN   Administration





  Nausea And Vomiting  


 


Sodium Chloride  10 ml  02/22/20 10:00  02/24/20 09:24





  Sodium Chloride Flush Syringe 10 Ml  IV   10 ml





  BID HOMER   Administration


 


Sodium Chloride  10 ml  02/22/20 00:49 





  Sodium Chloride Flush Syringe 10 Ml  IV  





  PRN PRN  





  LINE FLUSH

## 2021-02-08 ENCOUNTER — HOSPITAL ENCOUNTER (EMERGENCY)
Dept: HOSPITAL 5 - ED | Age: 36
Discharge: HOME | End: 2021-02-08
Payer: MEDICAID

## 2021-02-08 VITALS — DIASTOLIC BLOOD PRESSURE: 60 MMHG | SYSTOLIC BLOOD PRESSURE: 118 MMHG

## 2021-02-08 DIAGNOSIS — N39.0: Primary | ICD-10-CM

## 2021-02-08 DIAGNOSIS — F17.200: ICD-10-CM

## 2021-02-08 DIAGNOSIS — M19.91: ICD-10-CM

## 2021-02-08 DIAGNOSIS — I10: ICD-10-CM

## 2021-02-08 DIAGNOSIS — Z79.899: ICD-10-CM

## 2021-02-08 LAB
BACTERIA #/AREA URNS HPF: (no result) /HPF
BILIRUB UR QL STRIP: (no result)
BLOOD UR QL VISUAL: (no result)
PH UR STRIP: 6 [PH] (ref 5–7)
PROT UR STRIP-MCNC: (no result) MG/DL
RBC #/AREA URNS HPF: 5 /HPF (ref 0–6)
UROBILINOGEN UR-MCNC: < 2 MG/DL (ref ?–2)
WBC #/AREA URNS HPF: > 182 /HPF (ref 0–6)

## 2021-02-08 PROCEDURE — 81001 URINALYSIS AUTO W/SCOPE: CPT

## 2021-02-08 PROCEDURE — 81025 URINE PREGNANCY TEST: CPT

## 2021-02-08 PROCEDURE — 96372 THER/PROPH/DIAG INJ SC/IM: CPT

## 2021-02-08 PROCEDURE — 99283 EMERGENCY DEPT VISIT LOW MDM: CPT

## 2021-02-08 PROCEDURE — 87086 URINE CULTURE/COLONY COUNT: CPT

## 2021-02-08 NOTE — EMERGENCY DEPARTMENT REPORT
ED Abdominal Pain HPI





- General


Chief Complaint: Abdominal Pain


Stated Complaint: LOWER PAIN/BACK PAIN


Time Seen by Provider: 02/08/21 16:02


Source: patient


Mode of arrival: Ambulatory


Limitations: No Limitations





- History of Present Illness


Initial Comments: 





pt is a 36 yo female who presents to the ED with c/o lower abd pain that began 

three days ago. She has associated dysuria and urinary frequency. she denies any

n/v/d, fever, abnormal vaginal discharge, itching or burning, back pain. pt 

states she has been constipated, she is still tolerating PO intake and still can

pass gas. she has not taken anything for constipation. no pmhx. no allergies to 

meds. [facility] 1/15/2021





- Related Data


                                  Previous Rx's











 Medication  Instructions  Recorded  Last Taken  Type


 


Ondansetron [Zofran Odt] 4 mg PO Q6H #30 tab.rapdis 02/24/20 Unknown Rx


 


Pantoprazole [Protonix TAB] 40 mg PO BID #60 tablet 02/24/20 Unknown Rx


 


Sucralfate [Carafate] 1 gm PO ACHS #100 ml 02/24/20 Unknown Rx


 


Acetaminophen/Codeine [Tylenol 1 tab PO Q6H PRN #10 tab 02/08/21 Unknown Rx





/Codeine # 3 tab]    


 


Phenazopyridine [Pyridium] 100 mg PO TID #9 tab 02/08/21 Unknown Rx


 


Promethazine [Phenergan] 25 mg PO Q8HR PRN #10 tab 02/08/21 Unknown Rx


 


cephALEXin [Keflex] 500 mg PO BID 7 Days #14 cap 02/08/21 Unknown Rx











                                    Allergies











Allergy/AdvReac Type Severity Reaction Status Date / Time


 


No Known Allergies Allergy   Verified 02/21/20 19:46














ED Review of Systems


ROS: 


Stated complaint: LOWER PAIN/BACK PAIN


Other details as noted in HPI





Comment: All other systems reviewed and negative





ED Past Medical Hx





- Past Medical History


Previous Medical History?: Yes


Hx Hypertension: Yes


Hx Congestive Heart Failure: No


Hx Diabetes: No


Hx Arthritis: Yes


Hx Asthma: No


Hx COPD: No


Hx HIV: No





- Surgical History


Past Surgical History?: No





- Social History


Smoking Status: Current Every Day Smoker


Substance Use Type: None





- Medications


Home Medications: 


                                Home Medications











 Medication  Instructions  Recorded  Confirmed  Last Taken  Type


 


Ondansetron [Zofran Odt] 4 mg PO Q6H #30 tab.rapdis 02/24/20  Unknown Rx


 


Pantoprazole [Protonix TAB] 40 mg PO BID #60 tablet 02/24/20  Unknown Rx


 


Sucralfate [Carafate] 1 gm PO ACHS #100 ml 02/24/20  Unknown Rx


 


Acetaminophen/Codeine [Tylenol 1 tab PO Q6H PRN #10 tab 02/08/21  Unknown Rx





/Codeine # 3 tab]     


 


Phenazopyridine [Pyridium] 100 mg PO TID #9 tab 02/08/21  Unknown Rx


 


Promethazine [Phenergan] 25 mg PO Q8HR PRN #10 tab 02/08/21  Unknown Rx


 


cephALEXin [Keflex] 500 mg PO BID 7 Days #14 cap 02/08/21  Unknown Rx














ED Physical Exam





- General


Limitations: No Limitations


General appearance: alert, in no apparent distress





- Head


Head exam: Present: atraumatic, normocephalic





- Eye


Eye exam: Present: normal appearance





- ENT


ENT exam: Present: mucous membranes moist





- Respiratory


Respiratory exam: Present: normal lung sounds bilaterally.  Absent: respiratory 

distress, wheezes, rales, rhonchi, stridor, chest wall tenderness, accessory 

muscle use, decreased breath sounds, prolonged expiratory





- Cardiovascular


Cardiovascular Exam: Present: regular rate, normal rhythm, normal heart sounds. 

 Absent: systolic murmur, diastolic murmur, rubs, gallop





- GI/Abdominal


GI/Abdominal exam: Present: soft, tenderness (generalized lower), normal bowel 

sounds.  Absent: distended, guarding, rebound, rigid





- Neurological Exam


Neurological exam: Present: alert, oriented X3





- Psychiatric


Psychiatric exam: Present: normal affect, normal mood





- Skin


Skin exam: Present: warm, dry, intact





ED Course


                                   Vital Signs











  02/08/21 02/08/21





  15:38 18:40


 


Temperature 99.8 F H 


 


Pulse Rate 115 H 64


 


Respiratory 20 





Rate  


 


Blood Pressure 121/62 


 


Blood Pressure  118/60





[Left]  


 


O2 Sat by Pulse 99 





Oximetry  














ED Medical Decision Making





- Lab Data








                                   Lab Results











  02/08/21 Range/Units





  16:32 


 


Urine Color  Yellow  (Yellow)  


 


Urine Turbidity  Cloudy  (Clear)  


 


Urine pH  6.0  (5.0-7.0)  


 


Ur Specific Gravity  1.005  (1.003-1.030)  


 


Urine Protein  <15 mg/dl  (Negative)  mg/dL


 


Urine Glucose (UA)  Neg  (Negative)  mg/dL


 


Urine Ketones  Neg  (Negative)  mg/dL


 


Urine Blood  Sm  (Negative)  


 


Urine Nitrite  Neg  (Negative)  


 


Urine Bilirubin  Neg  (Negative)  


 


Urine Urobilinogen  < 2.0  (<2.0)  mg/dL


 


Ur Leukocyte Esterase  Lg  (Negative)  


 


Urine WBC (Auto)  > 182.0 H  (0.0-6.0)  /HPF


 


Urine RBC (Auto)  5.0  (0.0-6.0)  /HPF


 


U Epithel Cells (Auto)  4.0  (0-13.0)  /HPF


 


Urine Bacteria (Auto)  2+  (Negative)  /HPF


 


Urine Yeast (Budding)  1+  /HPF


 


Urine HCG, Qual  Negative  (Negative)  











                                   Vital Signs











  02/08/21 02/08/21





  15:38 18:40


 


Temperature 99.8 F H 


 


Pulse Rate 115 H 64


 


Respiratory 20 





Rate  


 


Blood Pressure 121/62 


 


Blood Pressure  118/60





[Left]  


 


O2 Sat by Pulse 99 





Oximetry  














- Medical Decision Making











pt is a 36 yo female who presents to the ED with c/o lower abd pain that began 

three days ago. She has associated dysuria and urinary frequency. she denies any

 n/v/d, fever, abnormal vaginal discharge, itching or burning, back pain. pt 

states she has been constipated, she is still tolerating PO intake and still can

 pass gas. she has not taken anything for constipation. no pmhx. no allergies to

 meds. [facility] 1/15/2021.  Initial vitals with mild low-grade temp and elevated 

heart rate which improved upon repeat.  UA shows evidence of significant 

UTI.Patient's urine results came back with significant UTI, ordered for patient 

to have 1 g ceftriaxone, pain medication, nausea medication, called patient 

overhead in the ED and there was no answer, called number on patient's chart and

 it was her mother's number, advised the mother to please have patient return as

 soon as possible, mother verbalized understanding, she states that her daughter

 does not have a cell phone at this time.  Patient has now returned to the 

emergency room for treatment.  Patient given Zofran, Norco, 1 g ceftriaxone IM 

while in the emergency department.  She was feeling much better and ready to go 

home.  She was tolerating p.o. intake without difficulty.  Patient given 

prescription for Keflex, Pyridium, Tylenol with codeine, Phenergan.  Advised 

patient please take medication as prescribed. increase your water intake. follow

 up with a primary care doctor and have your urine retested. return to the 

emergency room for any new or worsening symptoms. 


Critical care attestation.: 


If time is entered above; I have spent that time in minutes in the direct care 

of this critically ill patient, excluding procedure time.








ED Disposition


Clinical Impression: 


UTI (urinary tract infection)


Qualifiers:


 Urinary tract infection type: site unspecified Hematuria presence: without 

hematuria Qualified Code(s): N39.0 - Urinary tract infection, site not specified





Disposition: DC-01 TO HOME OR SELFCARE


Is pt being admited?: No


Does the pt Need Aspirin: No


Condition: Stable


Instructions:  Urinary Tract Infection, Adult, Easy-to-Read, Abdominal Pain (ED)


Additional Instructions: 


please take medication as prescribed. increase your water intake. follow up with

 a primary care doctor and have your urine retested. return to the emergency 

room for any new or worsening symptoms. 


Prescriptions: 


cephALEXin [Keflex] 500 mg PO BID 7 Days #14 cap


Promethazine [Phenergan] 25 mg PO Q8HR PRN #10 tab


 PRN Reason: nausea/vomiting


Phenazopyridine [Pyridium] 100 mg PO TID #9 tab


Acetaminophen/Codeine [Tylenol /Codeine # 3 tab] 1 tab PO Q6H PRN #10 tab


 PRN Reason: Pain , Severe (7-10)


Referrals: 


[facility] [Provider Group] - 2-3 Days


SHANI MORENO MD [Staff Physician] - 2-3 Days


Time of Disposition: 17:40


Print Language: ENGLISH

## 2022-08-09 ENCOUNTER — DASHBOARD ENCOUNTERS (OUTPATIENT)
Age: 37
End: 2022-08-09

## 2022-08-10 ENCOUNTER — OFFICE VISIT (OUTPATIENT)
Dept: URBAN - METROPOLITAN AREA CLINIC 109 | Facility: CLINIC | Age: 37
End: 2022-08-10

## 2022-08-10 VITALS — HEIGHT: 70 IN

## 2022-08-10 RX ORDER — PANTOPRAZOLE SODIUM 40 MG/1
1 TABLET TABLET, DELAYED RELEASE ORAL ONCE A DAY
Status: ACTIVE | COMMUNITY

## 2022-08-10 RX ORDER — MELOXICAM 15 MG/1
1 TABLET TABLET ORAL ONCE A DAY
Status: ACTIVE | COMMUNITY

## 2022-08-10 RX ORDER — VALACYCLOVIR HYDROCHLORIDE 500 MG/1
1 TABLET TABLET, FILM COATED ORAL ONCE A DAY
Status: ACTIVE | COMMUNITY